# Patient Record
Sex: MALE | Race: WHITE | NOT HISPANIC OR LATINO | Employment: FULL TIME | URBAN - METROPOLITAN AREA
[De-identification: names, ages, dates, MRNs, and addresses within clinical notes are randomized per-mention and may not be internally consistent; named-entity substitution may affect disease eponyms.]

---

## 2017-05-11 ENCOUNTER — OFFICE VISIT (OUTPATIENT)
Dept: FAMILY MEDICINE | Facility: CLINIC | Age: 32
End: 2017-05-11

## 2017-05-11 VITALS
RESPIRATION RATE: 16 BRPM | HEART RATE: 76 BPM | TEMPERATURE: 98.2 F | HEIGHT: 74 IN | WEIGHT: 207 LBS | BODY MASS INDEX: 26.56 KG/M2 | SYSTOLIC BLOOD PRESSURE: 134 MMHG | DIASTOLIC BLOOD PRESSURE: 79 MMHG | OXYGEN SATURATION: 99 %

## 2017-05-11 DIAGNOSIS — L28.0 LICHEN SIMPLEX CHRONICUS: Primary | ICD-10-CM

## 2017-05-11 RX ORDER — TRIAMCINOLONE ACETONIDE 1 MG/G
CREAM TOPICAL 3 TIMES DAILY
Qty: 80 G | Refills: 0 | Status: SHIPPED | OUTPATIENT
Start: 2017-05-11 | End: 2021-07-21

## 2017-05-11 ASSESSMENT — PAIN SCALES - GENERAL: PAINLEVEL: NO PAIN (0)

## 2017-05-11 NOTE — PATIENT INSTRUCTIONS
Use the cream three times daily.    Add OTC 24 hour antihistamine such as Claritin or zyrtec.    Please let me know if this does not get better and we can consider oral steroids as we talked about.    Please feel free to call our office if you have any questions.  Thank you for allowing me to participate in your care.  It was my pleasure meeting you.  Brenda Cheung PA-C

## 2017-05-11 NOTE — PROGRESS NOTES
SUBJECTIVE:                                                    Rk Quesada is a 31 year old male new patient who will be seeing Dr. Pisano next week for a physical.  Presents to clinic today for the following health issues:    Rash: First noted one year ago on hands.    Has come and gone since then with worsening over the past 2 weeks.  Present on hands and forarms to the elbows.  In the past, episodes lasted for a few days before resolving completely with minimal treatment.  This episode is more persistent.        Description:   Location: hands and forarms bilaterally  Character: blotchy  Itching (Pruritis): YES    Progression of Symptoms:  worsening    Accompanying Signs & Symptoms:  Fever: no   Body aches or joint pain: no   Sore throat symptoms: no   Recent cold symptoms: no    History:   Previous similar rash: YES- Off and on for the psat year    Precipitating factors:   Exposure to similar rash: no   New exposures: None   Recent travel: no     Alleviating factors:  HCN cream helps the itch a bit.     Therapies Tried and outcome: In the past did not treat with anything but with this episode has used benadryl pills at night and HCN and lidocream.      No history of allergies or hayfever.  No asthma.      Patient Active Problem List    Diagnosis Date Noted     Lichen simplex chronicus 05/11/2017     Priority: Medium       History reviewed. No pertinent past medical history.    Past Surgical History:   Procedure Laterality Date     SURGICAL HISTORY OF -  Right        Family History   Problem Relation Age of Onset     Hypertension Mother      Type 1 Diabetes Brother        Social History   Substance Use Topics     Smoking status: Never Smoker     Smokeless tobacco: Not on file     Alcohol use Yes      Comment: socially       Social History     Social History Narrative    Recently moved back to [place] from [facility].  Wife is chronically ill with Lyme disease.  They are living in [place] until Rk can find a job.  "He works as a .  Great deal of stress.       Current Outpatient Prescriptions   Medication Sig Dispense Refill     OMEPRAZOLE PO Take 20 mg by mouth daily       triamcinolone (KENALOG) 0.1 % cream Apply topically 3 times daily 80 g 0         Problem list and histories reviewed & adjusted, as indicated.  Additional history: as documented    Reviewed and updated as needed this visit by clinical staff  Tobacco  Allergies  Meds  Problems  Med Hx  Surg Hx  Fam Hx  Soc Hx        Reviewed and updated as needed this visit by Provider  Tobacco  Allergies  Meds  Problems  Med Hx  Surg Hx  Fam Hx  Soc Hx          ROS:  Constitutional, HEENT, cardiovascular, pulmonary, gi and gu systems are negative, except as otherwise noted.    OBJECTIVE:                                                    /79  Pulse 76  Temp 98.2  F (36.8  C) (Oral)  Resp 16  Ht 6' 2\" (188 cm)  Wt 207 lb (93.9 kg)  SpO2 99%  BMI 26.58 kg/m2  Body mass index is 26.58 kg/(m^2).  GENERAL: healthy, alert and no distress  NECK: no adenopathy, no asymmetry, masses, or scars and thyroid normal to palpation  RESP: lungs clear to auscultation - no rales, rhonchi or wheezes  CV: regular rate and rhythm, normal S1 S2, no S3 or S4, no murmur, click or rub, no peripheral edema and peripheral pulses strong  SKIN: Blotchy maculopapular rash on the forearms bilaterally.  No vesicles or papules present.  Slightly raised areas enrique with pressure.  No palpable pain.  Mild excoriation.         ASSESSMENT/PLAN:                                                        ICD-10-CM    1. Lichen simplex chronicus L28.0 triamcinolone (KENALOG) 0.1 % cream       Patient Instructions   Use the cream three times daily.    Add OTC 24 hour antihistamine such as Claritin or zyrtec.    Please let me know if this does not get better and we can consider oral steroids as we talked about.    Please feel free to call our office if you have any questions.  " Thank you for allowing me to participate in your care.  It was my pleasure meeting you.  Brenda Cheung PA-C  [facility]

## 2017-05-11 NOTE — MR AVS SNAPSHOT
After Visit Summary   5/11/2017    Rk Quesada    MRN: [id number]           Patient Information     Date Of Birth          1985        Visit Information        Provider Department      5/11/2017 11:20 AM Smiley Cheung PA-C [facility]        Today's Diagnoses     Lichen simplex chronicus    -  1      Care Instructions    Use the cream three times daily.    Add OTC 24 hour antihistamine such as Claritin or zyrtec.    Please let me know if this does not get better and we can consider oral steroids as we talked about.    Please feel free to call our office if you have any questions.  Thank you for allowing me to participate in your care.  It was my pleasure meeting you.  Brenda Cheung PA-C          Follow-ups after your visit        Your next 10 appointments already scheduled     Jun 07, 2017  1:00 PM CDT   PHYSICAL with José Luis Pisano MD   [facility] ([facility])    [address]  [place] [address]   [phone]              Who to contact     Please call your clinic at [phone] to:    Ask questions about your health    Make or cancel appointments    Discuss your medicines    Learn about your test results    Speak to your doctor   If you have compliments or concerns about an experience at your clinic, or if you wish to file a complaint, please contact [facility] Patient Relations at [phone] or email us at [email or web address]         Additional Information About Your Visit        MyChart Information     [employer] is an electronic gateway that provides easy, online access to your medical records. With [employer], you can request a clinic appointment, read your test results, renew a prescription or communicate with your care team.     To sign up for [employer] visit the website at [email or web address]   You will be asked to enter the access code listed below, as well as  "some personal information. Please follow the directions to create your username and password.     Your access code is: [email or web address]  Expires: 2017  8:09 AM     Your access code will  in 90 days. If you need help or a new code, please contact your [facility] or call [phone] for assistance.        Care EveryWhere ID     This is your Care EveryWhere ID. This could be used by other organizations to access your [facility] medical records  [id number]        Your Vitals Were     Pulse Temperature Respirations Height Pulse Oximetry BMI (Body Mass Index)    76 98.2  F (36.8  C) (Oral) 16 6' 2\" (188 cm) 99% 26.58 kg/m2       Blood Pressure from Last 3 Encounters:   17 147/88    Weight from Last 3 Encounters:   17 207 lb (93.9 kg)              Today, you had the following     No orders found for display         Today's Medication Changes          These changes are accurate as of: 17 11:44 AM.  If you have any questions, ask your nurse or doctor.               Start taking these medicines.        Dose/Directions    triamcinolone 0.1 % cream   Commonly known as:  KENALOG   Used for:  Lichen simplex chronicus   Started by:  Smiley Cheung PA-C        Apply topically 3 times daily   Quantity:  80 g   Refills:  0            Where to get your medicines      These medications were sent to [id number] - [place], [place] - [address] AT [address] [place] [address] & [address], [place] [address]    Hours:  Test fax successful 02   Phone:  [phone]     triamcinolone 0.1 % cream                Primary Care Provider Office Phone # Fax #    José Luis Pisano [phone]       [facility] [address] [place] [address] [address]  [facility] [address]        Thank you!     Thank you for choosing [facility]  for your care. Our goal is always to provide you with excellent care. Hearing back from our " patients is one way we can continue to improve our services. Please take a few minutes to complete the written survey that you may receive in the mail after your visit with us. Thank you!             Your Updated Medication List - Protect others around you: Learn how to safely use, store and throw away your medicines at www.disposemymeds.org.          This list is accurate as of: 5/11/17 11:44 AM.  Always use your most recent med list.                   Brand Name Dispense Instructions for use    OMEPRAZOLE PO      Take 20 mg by mouth daily       triamcinolone 0.1 % cream    KENALOG    80 g    Apply topically 3 times daily

## 2017-05-11 NOTE — NURSING NOTE
"31 year old  Chief Complaint   Patient presents with     Derm Problem     rash on both arms very itchy       Blood pressure 147/88, pulse 76, temperature 98.2  F (36.8  C), temperature source Oral, resp. rate 16, height 6' 2\" (188 cm), weight 207 lb (93.9 kg), SpO2 99 %. Body mass index is 26.58 kg/(m^2).  There is no problem list on file for this patient.      Wt Readings from Last 2 Encounters:   05/11/17 207 lb (93.9 kg)     BP Readings from Last 3 Encounters:   05/11/17 147/88         Current Outpatient Prescriptions   Medication     OMEPRAZOLE PO     No current facility-administered medications for this visit.        Social History   Substance Use Topics     Smoking status: Never Smoker     Smokeless tobacco: Not on file     Alcohol use Yes      Comment: socially       Health Maintenance Due   Topic Date Due     TETANUS IMMUNIZATION (SYSTEM ASSIGNED)  08/15/2002       No results found for: PAP      May 11, 2017 11:23 AM    "

## 2017-05-11 NOTE — LETTER
[employer]  [facility]  [address], [address]  [place] [address]  Fax: [phone]  Phone: [phone]      May 10, 2017      Rk Quesada  [address] [place] [address]        Dear Rk,    Thank you for your interest in becoming a [employer] user!    Your access code is: [email or web address]  Expires: 2017  8:09 AM     Please access the [employer] website at [email or web address].  Below the ID and password fields, select the  Sign Up Now  as New User.  You will be prompted to enter the access code listed above as well as additional personal information.  Please follow the directions carefully when creating your username and password.    If you allow your access code to , or if you have any questions please call a [employer] Representative at [phone] during normal clinic hours.     Sincerely,      [employer]  [facility]

## 2017-06-07 ENCOUNTER — TELEPHONE (OUTPATIENT)
Dept: FAMILY MEDICINE | Facility: CLINIC | Age: 32
End: 2017-06-07

## 2017-06-07 ENCOUNTER — OFFICE VISIT (OUTPATIENT)
Dept: FAMILY MEDICINE | Facility: CLINIC | Age: 32
End: 2017-06-07

## 2017-06-07 VITALS
WEIGHT: 204 LBS | BODY MASS INDEX: 27.63 KG/M2 | OXYGEN SATURATION: 98 % | SYSTOLIC BLOOD PRESSURE: 138 MMHG | TEMPERATURE: 97.7 F | HEART RATE: 75 BPM | DIASTOLIC BLOOD PRESSURE: 87 MMHG | HEIGHT: 72 IN

## 2017-06-07 DIAGNOSIS — R03.0 BORDERLINE BLOOD PRESSURE: ICD-10-CM

## 2017-06-07 DIAGNOSIS — I86.1 VARICOCELE: ICD-10-CM

## 2017-06-07 DIAGNOSIS — K21.9 GASTROESOPHAGEAL REFLUX DISEASE, ESOPHAGITIS PRESENCE NOT SPECIFIED: ICD-10-CM

## 2017-06-07 DIAGNOSIS — R61 NIGHT SWEATS: Primary | ICD-10-CM

## 2017-06-07 LAB
ALBUMIN SERPL-MCNC: 4.6 G/DL (ref 3.4–5)
ALP SERPL-CCNC: 73 U/L (ref 40–150)
ALT SERPL W P-5'-P-CCNC: 59 U/L (ref 0–70)
ANION GAP SERPL CALCULATED.3IONS-SCNC: 6 MMOL/L (ref 3–14)
AST SERPL W P-5'-P-CCNC: 31 U/L (ref 0–45)
BASOPHILS # BLD AUTO: 0 10E9/L (ref 0–0.2)
BASOPHILS NFR BLD AUTO: 0.4 %
BILIRUB SERPL-MCNC: 1.4 MG/DL (ref 0.2–1.3)
BUN SERPL-MCNC: 12 MG/DL (ref 7–30)
CALCIUM SERPL-MCNC: 9.4 MG/DL (ref 8.5–10.1)
CHLORIDE SERPL-SCNC: 103 MMOL/L (ref 94–109)
CHOLEST SERPL-MCNC: 166 MG/DL (ref 0–200)
CHOLEST/HDLC SERPL: 3.6 {RATIO} (ref 0–5)
CO2 SERPL-SCNC: 28 MMOL/L (ref 20–32)
CREAT SERPL-MCNC: 1.06 MG/DL (ref 0.66–1.25)
DIFFERENTIAL METHOD BLD: NORMAL
EOSINOPHIL # BLD AUTO: 0.1 10E9/L (ref 0–0.7)
EOSINOPHIL NFR BLD AUTO: 0.9 %
ERYTHROCYTE [DISTWIDTH] IN BLOOD BY AUTOMATED COUNT: 13 % (ref 10–15)
FASTING SPECIMEN: YES
GFR SERPL CREATININE-BSD FRML MDRD: 81 ML/MIN/1.7M2
GLUCOSE SERPL-MCNC: 89 MG/DL (ref 70–99)
HCT VFR BLD AUTO: 48.2 % (ref 40–53)
HDLC SERPL-MCNC: 46 MG/DL
HGB BLD-MCNC: 16 G/DL (ref 13.3–17.7)
IMM GRANULOCYTES # BLD: 0 10E9/L (ref 0–0.4)
IMM GRANULOCYTES NFR BLD: 0.2 %
LDLC SERPL CALC-MCNC: 102 MG/DL (ref 0–129)
LYMPHOCYTES # BLD AUTO: 1.8 10E9/L (ref 0.8–5.3)
LYMPHOCYTES NFR BLD AUTO: 34.4 %
MCH RBC QN AUTO: 29.1 PG (ref 26.5–33)
MCHC RBC AUTO-ENTMCNC: 33.2 G/DL (ref 31.5–36.5)
MCV RBC AUTO: 88 FL (ref 78–100)
MONOCYTES # BLD AUTO: 0.5 10E9/L (ref 0–1.3)
MONOCYTES NFR BLD AUTO: 8.8 %
NEUTROPHILS # BLD AUTO: 2.9 10E9/L (ref 1.6–8.3)
NEUTROPHILS NFR BLD AUTO: 55.3 %
NRBC # BLD AUTO: 0 10*3/UL
NRBC BLD AUTO-RTO: 0 /100
PLATELET # BLD AUTO: 191 10E9/L (ref 150–450)
POTASSIUM SERPL-SCNC: 4.6 MMOL/L (ref 3.4–5.3)
PROT SERPL-MCNC: 8.1 G/DL (ref 6.8–8.8)
RBC # BLD AUTO: 5.5 10E12/L (ref 4.4–5.9)
SODIUM SERPL-SCNC: 137 MMOL/L (ref 133–144)
TRIGL SERPL-MCNC: 86 MG/DL (ref 0–150)
VLDL-CHOLESTEROL: 17 (ref 7–32)
WBC # BLD AUTO: 5.3 10E9/L (ref 4–11)

## 2017-06-07 ASSESSMENT — PAIN SCALES - GENERAL: PAINLEVEL: NO PAIN (0)

## 2017-06-07 NOTE — PATIENT INSTRUCTIONS
"ASSESSMENT/PLAN:  Current visit issues:  Check CBC, Comp and Lipids  -Discussed issues of weight, diet and exercise. Suggested exercise at least 5 times/week. Also, try the \"DASH\" diet for the borderline hypertension. Initial goal weight of around 195 lbs within the next 2-3 months.   Given the large varicocele and no pregnancies, despite no use of contraception, will refer to Urology for further evaluation.   Discussed dietary issues re: GERD. Refilled Omeprazole but asked him to use sparingly. Discussed H2 blkers, but those have not been helpful. Tums has not been helpful.   If sx persist, refer again to GI.    Preventive health care needs:    -UTD on immunizations  -Encouraged to increase exercise intensity and frequency.   "

## 2017-06-07 NOTE — NURSING NOTE
"31 year old  Chief Complaint   Patient presents with     [facility] Care     Physical       Blood pressure 138/87, pulse 75, temperature 97.7  F (36.5  C), height 6' 0.3\" (183.6 cm), weight 204 lb (92.5 kg), SpO2 98 %. Body mass index is 27.44 kg/(m^2).  Patient Active Problem List   Diagnosis     Lichen simplex chronicus       Wt Readings from Last 2 Encounters:   06/07/17 204 lb (92.5 kg)   05/11/17 207 lb (93.9 kg)     BP Readings from Last 3 Encounters:   06/07/17 138/87   05/11/17 134/79         Current Outpatient Prescriptions   Medication     OMEPRAZOLE PO     triamcinolone (KENALOG) 0.1 % cream     No current facility-administered medications for this visit.        Social History   Substance Use Topics     Smoking status: Never Smoker     Smokeless tobacco: Not on file     Alcohol use Yes      Comment: socially       Health Maintenance Due   Topic Date Due     TETANUS IMMUNIZATION (SYSTEM ASSIGNED)  08/15/2002       No results found for: DAVE Ann CMA  June 7, 2017 1:00 PM  "

## 2017-06-07 NOTE — PROGRESS NOTES
"Rk Quesada presents to [facility] today to establish care and have a general check up.      His main concerns are as follows:   -\"Low on Omeprazole\" was a reason for the visit, but he has decided that he wants to stop the medication. However, concerned that his sx may recur  Was taking medication for the past 6 months for \"heartburn\". Was dx'd with \"heartburn\" at age 18 yo.  Can't recall any evaluation for H. Pylori. Main sx were an oral sensation and then some burning in chest. He has tried to link to diet but can't find triggers. Had upper GI.  With Omeprazole, does not have sx.     Has had some rashes on arms in the heat. A few weeks ago dx'd with Lichen simplex chronicus , given Triamcinolone and sx resolved.     Regarding preventive health care,   Immunization History   Administered Date(s) Administered     DTAP (<7y) 1985, 1985, 02/14/1985     Influenza (IIV3) 08/01/2016     Poliovirus, inactivated (IPV) 1984, 1984     TDAP Vaccine (Boostrix) 08/01/2016       His last dental check up was About Dec 2016.     Social  .   Works as a , but currently unemployed. Transitioning from [place] where he and his wife lived the past 8 years.   Wife is a nurse. She suffers from Chronic Lyme Disease.     They are currently living in [place]. Looking to move to the .     Patient Active Problem List   Diagnosis     Lichen simplex chronicus     Also, having some situational issues due to dealing with his wife's illness (chronic Lyme's), losing job in [place] and then move back to [place]. Has a visit with a counselor next week.     MALE ROS  Partner: wife  ED: No concerns  Contraception: None. OK with pregnancy  STD concerns: No concerns.   BPH: No symptoms      Regarding lifestyle behaviors, his physical activity tends to consist of: 3-4 days/week. Walking, running, elliptical     His diet tends to consist of: \"Healthy diet\".  Coffee only in morning. No soda pop. No " "food after dinner. Occasional beer at night.     Alcohol intake = 2-3 days/week. Has 1-2 beers.   He does not use tobacco products.      Wears seat belt.--Yes.  Wears bike helmet--N/A..        Health Maintenance   Topic Date Due     TETANUS IMMUNIZATION (SYSTEM ASSIGNED)  08/15/2002     INFLUENZA VACCINE (SYSTEM ASSIGNED)  09/01/2017         Patient Active Problem List   Diagnosis     Lichen simplex chronicus         Family History   Problem Relation Age of Onset     Hypertension Mother      Type 1 Diabetes Brother        Current Outpatient Prescriptions   Medication Sig Dispense Refill     OMEPRAZOLE PO Take 20 mg by mouth daily       triamcinolone (KENALOG) 0.1 % cream Apply topically 3 times daily (Patient not taking: Reported on 6/7/2017) 80 g 0     No Known Allergies       ROS  CONSTITUTIONAL:NEGATIVE for  change in weight, although he would like to be about 10 lbs lighter. ADMITS to some \"night sweats.\"   INTEGUMENTARY/SKIN: NEGATIVE for worrisome rashes, moles or lesions  EYES: NEGATIVE for vision changes or irritation  ENT/MOUTH: NEGATIVE for ear, mouth and throat problems  RESP:NEGATIVE for significant cough or SOB  CV: NEGATIVE for chest pain, palpitations, ORTIZ, orthopnea, PND  or peripheral edema  GI: NEGATIVE for nausea, abdominal pain, heartburn, or change in bowel habits  :NEGATIVE for frequency, dysuria, or hematuria  MUSCULOSKELETAL:NEGATIVE for significant arthralgias or myalgia  NEURO: NEGATIVE for weakness, dizziness or paresthesias  ENDOCRINE: NEGATIVE for polyuria/dipsia,  temperature intolerance, skin/hair changes  HEME/ALLERGY/IMMUNE: NEGATIVE for bleeding problems  PSYCHIATRIC: NEGATIVE for changes in mood or affect    EXAM  /87 (BP Location: Right arm, Patient Position: Chair, Cuff Size: Adult Large)  Pulse 75  Temp 97.7  F (36.5  C)  Ht 6' 0.3\" (183.6 cm)  Wt 204 lb (92.5 kg)  SpO2 98%  BMI 27.44 kg/m2      GENERAL APPEARANCE: Alert, pleasant, NAD  EYES: PERRL, EOMI, " "conjunctiva clear  HENT: TM normal bilaterally. Nose and mouth without lesions  NECK: no adenopathy, thyroid normal to palpation  RESP: lungs clear to auscultation bilaterally  Axillae: no palpable axillary masses or adenopathy  CV: regular rate and rhythm, normal S1 S2, No murmur, no carotid bruits  ABDOMEN: soft, nontender, without HSM or masses. Bowel sounds normal  : Large varicocele on LEFT.  no inguinal hernias or adenopathy, no testicular masses  Rectal exam: deferred  MS: extremities normal- no gross deformities noted, no tender, hot or swollen joints.    SKIN: no suspicious lesions or rashes  NEURO: Normal strength and tone, sensory exam grossly normal, DTR normoreflexive in upper and lower extremities  PSYCH: mentation appears normal. and affect normal/bright.  EXT: no peripheral edema, pedal pulses palpable    IMPRESSION  30 yo male, generally healthy who has the following issues:  -Borderline BP  -\"Overweight\" by BMI  -Night sweats (he's not sure if he's just a warm sleeper as he feels well during the day)  -Also, Left side, large Varicocele.   -Long h/o GERD    ASSESSMENT/PLAN:  Current visit issues:  Check CBC, Comp and Lipids  -Discussed issues of weight, diet and exercise. Suggested exercise at least 5 times/week. Also, try the \"DASH\" diet for the borderline hypertension. Initial goal weight of around 195 lbs within the next 2-3 months.   Given the large varicocele and no pregnancies, despite no use of contraception, will refer to Urology for further evaluation.   Discussed dietary issues re: GERD. Refilled Omeprazole but asked him to use sparingly. Discussed H2 blkers, but those have not been helpful. Tums has not been helpful.   If sx persist, refer again to GI.    Preventive health care needs:    -UTD on immunizations  -Encouraged to increase exercise intensity and frequency.       --José Luis Pisano MD      "

## 2017-06-07 NOTE — LETTER
"                                      [facility]  [address]  [facility] [place] [address]  Phone: [phone]  Fax: [phone]       Date: June 8, 2017      Rk Dipak  [address] [place] [address]        Hi Rk,  Good news. All of your labs returned in the 'normal' range. No signs of kidney or liver problems. The Bilirubin was 0.1mg/dl above \"normal.\" However, we see this in many people.   --José Luis Pisano MD    Resulted Orders   CBC with platelets differential   Result Value Ref Range    WBC 5.3 4.0 - 11.0 10e9/L    RBC Count 5.50 4.4 - 5.9 10e12/L    Hemoglobin 16.0 13.3 - 17.7 g/dL    Hematocrit 48.2 40.0 - 53.0 %    MCV 88 78 - 100 fl    MCH 29.1 26.5 - 33.0 pg    MCHC 33.2 31.5 - 36.5 g/dL    RDW 13.0 10.0 - 15.0 %    Platelet Count 191 150 - 450 10e9/L    Diff Method Automated Method     % Neutrophils 55.3 %    % Lymphocytes 34.4 %    % Monocytes 8.8 %    % Eosinophils 0.9 %    % Basophils 0.4 %    % Immature Granulocytes 0.2 %    Nucleated RBCs 0 0 /100    Absolute Neutrophil 2.9 1.6 - 8.3 10e9/L    Absolute Lymphocytes 1.8 0.8 - 5.3 10e9/L    Absolute Monocytes 0.5 0.0 - 1.3 10e9/L    Absolute Eosinophils 0.1 0.0 - 0.7 10e9/L    Absolute Basophils 0.0 0.0 - 0.2 10e9/L    Abs Immature Granulocytes 0.0 0 - 0.4 10e9/L    Absolute Nucleated RBC 0.0    Lipid Panel ([place])   Result Value Ref Range    FASTING SPECIMEN yes     Cholesterol 166.0 0.0 - 200.0    HDL Cholesterol 46.0 >40.0    Triglycerides 86.0 0.0 - 150.0    Cholesterol/HDL Ratio 3.6 0.0 - 5.0    LDL Cholesterol Direct 102.0 0.0 - 129.0    VLDL-Cholesterol 17.0 7.0 - 32.0   Comprehensive metabolic panel   Result Value Ref Range    Sodium 137 133 - 144 mmol/L    Potassium 4.6 3.4 - 5.3 mmol/L    Chloride 103 94 - 109 mmol/L    Carbon Dioxide 28 20 - 32 mmol/L    Anion Gap 6 3 - 14 mmol/L    Glucose 89 70 - 99 mg/dL    Urea Nitrogen 12 7 - 30 mg/dL    Creatinine 1.06 0.66 - 1.25 mg/dL "    GFR Estimate 81 >60 mL/min/1.7m2      Comment:      Non  GFR Calc    GFR Estimate If Black >90   GFR Calc   >60 mL/min/1.7m2    Calcium 9.4 8.5 - 10.1 mg/dL    Bilirubin Total 1.4 (H) 0.2 - 1.3 mg/dL    Albumin 4.6 3.4 - 5.0 g/dL    Protein Total 8.1 6.8 - 8.8 g/dL    Alkaline Phosphatase 73 40 - 150 U/L    ALT 59 0 - 70 U/L    AST 31 0 - 45 U/L

## 2017-06-07 NOTE — MR AVS SNAPSHOT
"              After Visit Summary   6/7/2017    Rk Quesada    MRN: [id number]           Patient Information     Date Of Birth          1985        Visit Information        Provider Department      6/7/2017 1:00 PM José Luis Pisano MD [facility]        Today's Diagnoses     Night sweats    -  1    Borderline blood pressure        Gastroesophageal reflux disease, esophagitis presence not specified        Varicocele          Care Instructions    ASSESSMENT/PLAN:  Current visit issues:  Check CBC, Comp and Lipids  -Discussed issues of weight, diet and exercise. Suggested exercise at least 5 times/week. Also, try the \"DASH\" diet for the borderline hypertension. Initial goal weight of around 195 lbs within the next 2-3 months.   Given the large varicocele and no pregnancies, despite no use of contraception, will refer to Urology for further evaluation.   Discussed dietary issues re: GERD. Refilled Omeprazole but asked him to use sparingly. Discussed H2 blkers, but those have not been helpful. Tums has not been helpful.   If sx persist, refer again to GI.    Preventive health care needs:    -UTD on immunizations  -Encouraged to increase exercise intensity and frequency.           Follow-ups after your visit        Additional Services     UROLOGY ADULT REFERRAL       Your provider has referred you to: [facility]: [facility] [place], [place] [phone]   [email or web address]  Indication: Varicocele with question of infertility    Please be aware that coverage of these services is subject to the terms and limitations of your health insurance plan.  Call member services at your health plan with any benefit or coverage questions.      Please bring the following with you to your appointment:    (1) Any X-Rays, CTs or MRIs which have been performed.  Contact the facility where they were done to arrange for  prior to your scheduled appointment.  " "  (2) List of current medications  (3) This referral request   (4) Any documents/labs given to you for this referral                  Who to contact     Please call your clinic at [phone] to:    Ask questions about your health    Make or cancel appointments    Discuss your medicines    Learn about your test results    Speak to your doctor   If you have compliments or concerns about an experience at your clinic, or if you wish to file a complaint, please contact [facility] Patient Relations at [phone] or email us at [email or web address]         Additional Information About Your Visit        Panaya Information     Panaya is an electronic gateway that provides easy, online access to your medical records. With Panaya, you can request a clinic appointment, read your test results, renew a prescription or communicate with your care team.     To sign up for Panaya visit the website at [email or web address]   You will be asked to enter the access code listed below, as well as some personal information. Please follow the directions to create your username and password.     Your access code is: [email or web address]  Expires: 2017  8:09 AM     Your access code will  in 90 days. If you need help or a new code, please contact your [facility] or call [phone] for assistance.        Care EveryWhere ID     This is your Care EveryWhere ID. This could be used by other organizations to access your [facility] medical records  [id number]        Your Vitals Were     Pulse Temperature Height Pulse Oximetry BMI (Body Mass Index)       75 97.7  F (36.5  C) 6' 0.3\" (183.6 cm) 98% 27.44 kg/m2        Blood Pressure from Last 3 Encounters:   17 138/87   17 134/79    Weight from Last 3 Encounters:   17 204 lb (92.5 kg)   17 207 lb (93.9 kg)              We Performed the Following     CBC with platelets differential     " Comprehensive metabolic panel     Lipid Panel ([place])     UROLOGY ADULT REFERRAL          Today's Medication Changes          These changes are accurate as of: 6/7/17  1:45 PM.  If you have any questions, ask your nurse or doctor.               These medicines have changed or have updated prescriptions.        Dose/Directions    omeprazole 20 MG CR capsule   Commonly known as:  priLOSEC   This may have changed:  medication strength   Used for:  Gastroesophageal reflux disease, esophagitis presence not specified   Changed by:  José Luis Pisano MD        Dose:  20 mg   Take 1 capsule (20 mg) by mouth daily   Quantity:  30 capsule   Refills:  3            Where to get your medicines      These medications were sent to [id number] - [place], [place] - [address] AT [address] [place] [address] & [address], [place] [address]    Hours:  Test fax successful 9/6/02   Phone:  [phone]     omeprazole 20 MG CR capsule                Primary Care Provider Office Phone # Fax #    José Luis Pisano [phone]       [facility] [address] [place] [address] [address]  [facility] [address]        Thank you!     Thank you for choosing [facility]  for your care. Our goal is always to provide you with excellent care. Hearing back from our patients is one way we can continue to improve our services. Please take a few minutes to complete the written survey that you may receive in the mail after your visit with us. Thank you!             Your Updated Medication List - Protect others around you: Learn how to safely use, store and throw away your medicines at www.disposemymeds.org.          This list is accurate as of: 6/7/17  1:45 PM.  Always use your most recent med list.                   Brand Name Dispense Instructions for use    omeprazole 20 MG CR capsule    priLOSEC    30 capsule    Take 1 capsule (20 mg) by mouth daily       triamcinolone 0.1 %  cream    KENALOG    80 g    Apply topically 3 times daily

## 2018-02-08 ENCOUNTER — OFFICE VISIT (OUTPATIENT)
Dept: DERMATOLOGY | Facility: CLINIC | Age: 33
End: 2018-02-08
Payer: COMMERCIAL

## 2018-02-08 DIAGNOSIS — D48.5 NEOPLASM OF UNCERTAIN BEHAVIOR OF SKIN: Primary | ICD-10-CM

## 2018-02-08 ASSESSMENT — PAIN SCALES - GENERAL: PAINLEVEL: NO PAIN (0)

## 2018-02-08 NOTE — LETTER
"2/8/2018       RE: Rk Quesada  [address]  [place] [address]     Dear Colleague,    Thank you for referring your patient, Rk Quesada, to the [facility] DERMATOLOGY at [facility]. Please see a copy of my visit note below.    [facility] Dermatology Note      Dermatology Problem List:   1. Lichen simplex chronicus, resolved with triamcinolone 0.1% cream     Encounter Date: Feb 8, 2018    CC:  Chief Complaint   Patient presents with     Derm Problem     Mr. Quesada is here today to have a lesion on the right temple. It has been there for maybe ten years or less. It has recently started flaking. It was biopsied about 2 years ago and it was noncancerous.          History of Present Illness:  Mr. Rk Quesada is a 32 year old male who presents today with concerns about a lesion on the right temple. The patient reports he developed a \"spot\" on the right temple about 10 years ago. This seemed to be getting bigger and therefore had it biopsied at [facility] approximately 2 years ago, which returned benign (records not currently available). However, patient states he was in [place] two weeks ago and noticed the same area over the right temple started to become dry and flaking. It was previously not bothersome to the patient but has continued to do this and therefore would like it evaluated.     He otherwise denies any other areas of concern.      Past Medical History:   Patient Active Problem List   Diagnosis     Lichen simplex chronicus     Past Medical History:   Diagnosis Date     Hand injury 2013    Tore Hand ligarment     Past Surgical History:   Procedure Laterality Date     HC TOOTH EXTRACTION W/FORCEP  2001     SURGICAL HISTORY OF -  Right        Social History:  The patient works. The patient admits to use of tanning beds (2-3 times prior to vacations). He previously lived in [place] for 8 years and therefore has had significant year round " sun exposure in the past. Is originally from [place]. 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     Family History:  There is no family history of skin cancer.    Medications:  Current Outpatient Prescriptions   Medication Sig Dispense Refill     omeprazole (PRILOSEC) 20 MG CR capsule Take 1 capsule (20 mg) by mouth daily (Patient not taking: Reported on 2/8/2018) 30 capsule 3     triamcinolone (KENALOG) 0.1 % cream Apply topically 3 times daily (Patient not taking: Reported on 6/7/2017) 80 g 0       No Known Allergies    Review of Systems:  -Skin/Heme New Pt: The patient admits to frequent sun exposure. The patient denies excessive scarring or problems healing except as per HPI. The patient denies excessive bleeding.  -Constitutional: The patient denies fatigue, fevers, chills, unintended weight loss, and night sweats.  -HEENT: Patient denies nonhealing oral sores.  -Skin: As above in HPI. No additional skin concerns.    Physical exam:  Vitals: There were no vitals taken for this visit.  GEN: This is a well developed, well-nourished male in no acute distress, in a pleasant mood.    SKIN: Focused examination of the face was performed.  - Thin brown plaque to right temple with mild scale and erythema.   -No other lesions of concern on areas examined.       Impression/Plan:  1. Skin lesion, likely a seborrheic keratosis.    Discussed treatment options including shave biopsy vs. Monitoring for changes. However, the patient states he would like to know what the old pathology report from [facility] says before making a decision. Therefore, we will request these records. I will also refer the patient to plastic surgery to potentially have the lesion removed with margins and cosmetic repair.     Photo documentation performed today for further reference.     I received the pathology report and medical record from [facility]. The pathology returned as a 2 mm punch bx consistent with with a solar  lentigo with dermal melanosis. Since this is a melanocyctic lesion and changing, Discussed with Dr Layne. Recommended consultation with him and discuss shave bx vs excisional bx. A dermatology surgery referral was placed.     Follow-up as needed.     Staff Involved:  Scribe/Staff    Scribe Disclosure:   I, Tammy Soriano, am serving as a scribe to document services personally performed by Tiana Smith PA-C, based on data collection and the provider's statements to me.    Provider Disclosure:   The documentation recorded by the scribe accurately reflects the services I personally performed and the decisions made by me.    All risks, benefits and alternatives were discussed with patient.  Patient is in agreement and understands the assessment and plan.  All questions were answered.  Sun Screen Education was given.   Return to Clinic as needed after repeat bx.   Tiana Smith PA-C   [facility]

## 2018-02-08 NOTE — PROGRESS NOTES
"[facility] Dermatology Note      Dermatology Problem List:   1. Lichen simplex chronicus, resolved with triamcinolone 0.1% cream     Encounter Date: Feb 8, 2018    CC:  Chief Complaint   Patient presents with     Derm Problem     Mr. Quesada is here today to have a lesion on the right temple. It has been there for maybe ten years or less. It has recently started flaking. It was biopsied about 2 years ago and it was noncancerous.          History of Present Illness:  Mr. Rk Quesada is a 32 year old male who presents today with concerns about a lesion on the right temple. The patient reports he developed a \"spot\" on the right temple about 10 years ago. This seemed to be getting bigger and therefore had it biopsied at [facility] approximately 2 years ago, which returned benign (records not currently available). However, patient states he was in [place] two weeks ago and noticed the same area over the right temple started to become dry and flaking. It was previously not bothersome to the patient but has continued to do this and therefore would like it evaluated.     He otherwise denies any other areas of concern.      Past Medical History:   Patient Active Problem List   Diagnosis     Lichen simplex chronicus     Past Medical History:   Diagnosis Date     Hand injury 2013    Tore Hand ligarment     Past Surgical History:   Procedure Laterality Date     HC TOOTH EXTRACTION W/FORCEP  2001     SURGICAL HISTORY OF -  Right        Social History:  The patient works. The patient admits to use of tanning beds (2-3 times prior to vacations). He previously lived in [place] for 8 years and therefore has had significant year round sun exposure in the past. Is originally from [place]. 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     Family History:  There is no family history of skin cancer.    Medications:  Current Outpatient Prescriptions   Medication Sig Dispense Refill     " omeprazole (PRILOSEC) 20 MG CR capsule Take 1 capsule (20 mg) by mouth daily (Patient not taking: Reported on 2/8/2018) 30 capsule 3     triamcinolone (KENALOG) 0.1 % cream Apply topically 3 times daily (Patient not taking: Reported on 6/7/2017) 80 g 0       No Known Allergies    Review of Systems:  -Skin/Heme New Pt: The patient admits to frequent sun exposure. The patient denies excessive scarring or problems healing except as per HPI. The patient denies excessive bleeding.  -Constitutional: The patient denies fatigue, fevers, chills, unintended weight loss, and night sweats.  -HEENT: Patient denies nonhealing oral sores.  -Skin: As above in HPI. No additional skin concerns.    Physical exam:  Vitals: There were no vitals taken for this visit.  GEN: This is a well developed, well-nourished male in no acute distress, in a pleasant mood.    SKIN: Focused examination of the face was performed.  - Thin brown plaque to right temple with mild scale and erythema.   -No other lesions of concern on areas examined.       Impression/Plan:  1. Skin lesion, likely a seborrheic keratosis.    Discussed treatment options including shave biopsy vs. Monitoring for changes. However, the patient states he would like to know what the old pathology report from [facility] says before making a decision. Therefore, we will request these records. I will also refer the patient to plastic surgery to potentially have the lesion removed with margins and cosmetic repair.     Photo documentation performed today for further reference.     I received the pathology report and medical record from [facility]. The pathology returned as a 2 mm punch bx consistent with with a solar lentigo with dermal melanosis. Since this is a melanocyctic lesion and changing, Discussed with Dr Layne. Recommended consultation with him and discuss shave bx vs excisional bx. A dermatology surgery referral was placed.     Follow-up as needed.     Staff  Involved:  Scribe/Staff    Scribe Disclosure:   I, Tammycarlyle Soriano, am serving as a scribe to document services personally performed by Tiana Smith PA-C, based on data collection and the provider's statements to me.    Provider Disclosure:   The documentation recorded by the scribe accurately reflects the services I personally performed and the decisions made by me.    All risks, benefits and alternatives were discussed with patient.  Patient is in agreement and understands the assessment and plan.  All questions were answered.  Sun Screen Education was given.   Return to Clinic as needed after repeat bx.   Tiana Smith PA-C   [facility]

## 2018-02-08 NOTE — MR AVS SNAPSHOT
After Visit Summary   2/8/2018    Rk Quesada    MRN: [id number]           Patient Information     Date Of Birth          1985        Visit Information        Provider Department      2/8/2018 2:30 PM Tiana Smith PA-C [facility] Dermatology        Today's Diagnoses     Neoplasm of uncertain behavior of skin    -  1       Follow-ups after your visit        Additional Services     DERMATOLOGY SURGERY REFERRAL       Result Note/Instructions:  Received pathology report 2 mm punch bx lentigo with dermal melanosis.                  Who to contact     Please call your clinic at [phone] to:    Ask questions about your health    Make or cancel appointments    Discuss your medicines    Learn about your test results    Speak to your doctor            Additional Information About Your Visit        [employer] Customer Support Information     Personal gives you secure access to your electronic health record. If you see a primary care provider, you can also send messages to your care team and make appointments. If you have questions, please call your primary care clinic.  If you do not have a primary care provider, please call [phone] and they will assist you.      Personal is an electronic gateway that provides easy, online access to your medical records. With Personal, you can request a clinic appointment, read your test results, renew a prescription or communicate with your care team.     To access your existing account, please contact your [facility] or call [phone] for assistance.        Care EveryWhere ID     This is your Care EveryWhere ID. This could be used by other organizations to access your [facility] medical records  [id number]         Blood Pressure from Last 3 Encounters:   06/07/17 138/87   05/11/17 134/79    Weight from Last 3 Encounters:   06/07/17 92.5 kg (204 lb)   05/11/17 93.9 kg (207 lb)              We Performed the Following     DERMATOLOGY SURGERY  REFERRAL        Primary Care Provider Office Phone # Fax #    José Luis Pisano [phone]       [address]  [place] [address]        Equal Access to Services     NABIL MILLER : Rayna west maria ro Sojillianali, waaxda luqadaha, qaybta kaalmada adeegyada, claudia krusen tomásrandee montelongoanshul wilburn. So [facility] [phone].    ATENCIÓN: Si habla español, tiene a flores disposición servicios gratuitos de asistencia lingüística. Llame al [phone].    We comply with applicable federal civil rights laws and [place] laws. We do not discriminate on the basis of race, color, national origin, age, disability, sex, sexual orientation, or gender identity.            Thank you!     Thank you for choosing [facility] DERMATOLOGY  for your care. Our goal is always to provide you with excellent care. Hearing back from our patients is one way we can continue to improve our services. Please take a few minutes to complete the written survey that you may receive in the mail after your visit with us. Thank you!             Your Updated Medication List - Protect others around you: Learn how to safely use, store and throw away your medicines at www.disposemymeds.org.          This list is accurate as of 2/8/18 11:59 PM.  Always use your most recent med list.                   Brand Name Dispense Instructions for use Diagnosis    omeprazole 20 MG CR capsule    priLOSEC    30 capsule    Take 1 capsule (20 mg) by mouth daily    Gastroesophageal reflux disease, esophagitis presence not specified       triamcinolone 0.1 % cream    KENALOG    80 g    Apply topically 3 times daily    Lichen simplex chronicus

## 2018-02-08 NOTE — NURSING NOTE
Chief Complaint   Patient presents with     Derm Problem     Mr. Quesada is here today to have a lesion on the right temple. It has been there for maybe ten years or less. It has recently started flaking. It was biopsied about 2 years ago and it was noncancerous.      Margarita Bradley CMA

## 2018-02-14 ENCOUNTER — TELEPHONE (OUTPATIENT)
Dept: DERMATOLOGY | Facility: CLINIC | Age: 33
End: 2018-02-14

## 2018-02-14 NOTE — TELEPHONE ENCOUNTER
Records received from Dr. Grady office  ( from [place] ), placed in scanning. I left a  for Rk in attempt to schedule appt.  See message below from provider.      I sent a derm referral for Rk.     His pathology report showed lentigo (sun spot) with melanin deeper in the skin. Talked with derm surgeon, Dr Layne, recommend a consult for excisional bx (remove as an excision) vs shave bx.     Call and let him know! Thanks!     Thanks, Tiana

## 2018-02-27 ENCOUNTER — OFFICE VISIT (OUTPATIENT)
Dept: DERMATOLOGY | Facility: CLINIC | Age: 33
End: 2018-02-27
Payer: COMMERCIAL

## 2018-02-27 DIAGNOSIS — L81.4 SOLAR LENTIGO: Primary | ICD-10-CM

## 2018-02-27 ASSESSMENT — PAIN SCALES - GENERAL: PAINLEVEL: NO PAIN (0)

## 2018-02-27 NOTE — PROGRESS NOTES
[facility] Dermatology Note      Dermatology Problem List:  1. Lichen simplex chronicus, resolved with triamcinolone 0.1% cream   2. Solar lentigo w/ dermal melanosis, R temple     Encounter Date: Feb 27, 2018    CC:  Chief Complaint   Patient presents with     Derm Problem     Rk is here for a consult to remove a spot on his face.       History of Present Illness:  Mr. Rk Quesada is a 32 year old male who presents today for a consultation regarding a lesion located on his right temple. At his last appointment with Dr. Smith on 2/8/18, they discussed possible treatment options, however the patient was interested in recovering a pathology report from a biopsy that was taken on the lesion 2 years ago. Dr. Smith was able to recover the record, which returned as a solar lentigo with dermal melanosis.     The patient says the lesion appeared about 10 years ago. Over the past two years it seems like the lesion is progressively getting bigger. He went to a conference in [place] for about a week recently, and when he returned it had become dry, pruritic, and flaky. He thought the sun had aggravated it. He lived in [place] for 8 years earlier in his life.     The patient is otherwise feeling well. There are no other skin concerns at this time.    Past Medical History:   Patient Active Problem List   Diagnosis     Lichen simplex chronicus     Past Medical History:   Diagnosis Date     Hand injury 2013    Tore Hand ligarment     Past Surgical History:   Procedure Laterality Date     HC TOOTH EXTRACTION W/FORCEP  2001     SURGICAL HISTORY OF -  Right        Social History:  The patient works. The patient admits to use of tanning beds (2-3 times prior to vacations). He previously lived in [place] for 8 years and therefore has had significant year round sun exposure in the past. Is originally from [place]. 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      Family History:  There is no family history of skin cancer.    Medications:  Current Outpatient Prescriptions   Medication Sig Dispense Refill     omeprazole (PRILOSEC) 20 MG CR capsule Take 1 capsule (20 mg) by mouth daily (Patient not taking: Reported on 2/8/2018) 30 capsule 3     triamcinolone (KENALOG) 0.1 % cream Apply topically 3 times daily (Patient not taking: Reported on 6/7/2017) 80 g 0       No Known Allergies    Review of Systems:  -Skin Establ Pt: The patient denies any new rash, pruritus, or lesions that are symptomatic, changing or bleeding, except as per HPI.  -Constitutional: The patient is feeling generally well.    Physical exam:  Vitals: There were no vitals taken for this visit.  GEN: This is a well developed, well-nourished male in no acute distress, in a pleasant mood.    SKIN: Focused examination of the lesion of concern was performed.  - R temple, 1.2 x 0.8 cm tan ovoid patch   - No other lesions of concern on areas examined.     Impression/Plan:  1. Solar lentigo with dermal melanosis, R temple    Dermoscopy with uniform and reassuring features.     Recent irritation and scaling could have a component of lichen planus like keratosis.     Discussed options for treatment. Told patient that if we were to excise the spot he would be trading the lesion for a scar, but would get pathologic confirmation of the diagnosis again.     Also discussed he possibility of treatment with lasers to reduce pigmentation. Discussed that laser treatment may require several treatment sessions.     Patient expressed interest in laser treatment and will be following up and scheduling an appointment with Dr. Longo.     Follow-up with Dr. Longo for laser consultation.     Staff Involved:    Scribe Disclosure  I, Michael Hernandez, josé luis serving as a scribe to document services personally performed by Dr. Antonio Layne DO, based on data collection and the provider's statements to me.     Provider Disclosure:   The  documentation recorded by the scribe accurately reflects the services I personally performed and the decisions made by me.    Antonio Layne DO    Department of Dermatology  [facility]: Phone: [phone], Fax:[phone]  [facility] [place]: Phone: [phone], Fax: [phone]            Picture placed in chart for future reference.

## 2018-02-27 NOTE — NURSING NOTE
Chief Complaint   Patient presents with     Derm Problem     Rk is here for a consult to remove a spot on his face.     Gracie Cordero CMA

## 2018-02-27 NOTE — LETTER
2/27/2018       RE: Rk Quesada  [address]  [place] [address]     Dear Colleague,    Thank you for referring your patient, Rk Quesada, to the [facility] DERMATOLOGIC SURGERY at [facility]. Please see a copy of my visit note below.    [facility] Dermatology Note      Dermatology Problem List:  1. Lichen simplex chronicus, resolved with triamcinolone 0.1% cream   2. Solar lentigo w/ dermal melanosis, R temple     Encounter Date: Feb 27, 2018    CC:  Chief Complaint   Patient presents with     Derm Problem     Rk is here for a consult to remove a spot on his face.       History of Present Illness:  Mr. Rk Quesada is a 32 year old male who presents today for a consultation regarding a lesion located on his right temple. At his last appointment with Dr. Smith on 2/8/18, they discussed possible treatment options, however the patient was interested in recovering a pathology report from a biopsy that was taken on the lesion 2 years ago. Dr. Smith was able to recover the record, which returned as a solar lentigo with dermal melanosis.     The patient says the lesion appeared about 10 years ago. Over the past two years it seems like the lesion is progressively getting bigger. He went to a conference in [place] for about a week recently, and when he returned it had become dry, pruritic, and flaky. He thought the sun had aggravated it. He lived in [place] for 8 years earlier in his life.     The patient is otherwise feeling well. There are no other skin concerns at this time.    Past Medical History:   Patient Active Problem List   Diagnosis     Lichen simplex chronicus     Past Medical History:   Diagnosis Date     Hand injury 2013    Tore Hand ligarment     Past Surgical History:   Procedure Laterality Date     HC TOOTH EXTRACTION W/FORCEP  2001     SURGICAL HISTORY OF -  Right        Social History:  The patient works. The  patient admits to use of tanning beds (2-3 times prior to vacations). He previously lived in [place] for 8 years and therefore has had significant year round sun exposure in the past. Is originally from [place]. 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     Family History:  There is no family history of skin cancer.    Medications:  Current Outpatient Prescriptions   Medication Sig Dispense Refill     omeprazole (PRILOSEC) 20 MG CR capsule Take 1 capsule (20 mg) by mouth daily (Patient not taking: Reported on 2/8/2018) 30 capsule 3     triamcinolone (KENALOG) 0.1 % cream Apply topically 3 times daily (Patient not taking: Reported on 6/7/2017) 80 g 0       No Known Allergies    Review of Systems:  -Skin Establ Pt: The patient denies any new rash, pruritus, or lesions that are symptomatic, changing or bleeding, except as per HPI.  -Constitutional: The patient is feeling generally well.    Physical exam:  Vitals: There were no vitals taken for this visit.  GEN: This is a well developed, well-nourished male in no acute distress, in a pleasant mood.    SKIN: Focused examination of the lesion of concern was performed.  - R temple, 1.2 x 0.8 cm tan ovoid patch   - No other lesions of concern on areas examined.     Impression/Plan:  1. Solar lentigo with dermal melanosis, R temple    Dermoscopy with uniform and reassuring features.     Recent irritation and scaling could have a component of lichen planus like keratosis.     Discussed options for treatment. Told patient that if we were to excise the spot he would be trading the lesion for a scar, but would get pathologic confirmation of the diagnosis again.     Also discussed he possibility of treatment with lasers to reduce pigmentation. Discussed that laser treatment may require several treatment sessions.     Patient expressed interest in laser treatment and will be following up and scheduling an appointment with Dr. Longo.      Follow-up with Dr. Longo for laser consultation.     Staff Involved:    Scribe Disclosure  I, Michael Hernandez, am serving as a scribe to document services personally performed by Dr. Antonio Layne DO, based on data collection and the provider's statements to me.     Provider Disclosure:   The documentation recorded by the scribe accurately reflects the services I personally performed and the decisions made by me.    Antonio Layne DO    Department of Dermatology  [facility]: Phone: [phone], Fax:[phone]  [facility]: Phone: [phone], Fax: [phone]            Picture placed in chart for future reference.

## 2018-02-27 NOTE — MR AVS SNAPSHOT
After Visit Summary   2/27/2018    Rk Quesada    MRN: [id number]           Patient Information     Date Of Birth          1985        Visit Information        Provider Department      2/27/2018 3:15 PM Antonio Layne MD [facility] Dermatologic Surgery        Today's Diagnoses     Solar lentigo    -  1      Care Instructions                      Follow-ups after your visit        Who to contact     Please call your clinic at [phone] to:    Ask questions about your health    Make or cancel appointments    Discuss your medicines    Learn about your test results    Speak to your doctor            Additional Information About Your Visit        MyChart Information     [employer] gives you secure access to your electronic health record. If you see a primary care provider, you can also send messages to your care team and make appointments. If you have questions, please call your primary care clinic.  If you do not have a primary care provider, please call [phone] and they will assist you.      [employer] is an electronic gateway that provides easy, online access to your medical records. With [employer], you can request a clinic appointment, read your test results, renew a prescription or communicate with your care team.     To access your existing account, please contact your [facility] or call [phone] for assistance.        Care EveryWhere ID     This is your Care EveryWhere ID. This could be used by other organizations to access your [facility] medical records  [id number]         Blood Pressure from Last 3 Encounters:   06/07/17 138/87   05/11/17 134/79    Weight from Last 3 Encounters:   06/07/17 92.5 kg (204 lb)   05/11/17 93.9 kg (207 lb)              Today, you had the following     No orders found for display       Primary Care Provider Office Phone # Fax #    José Luis Pisano [phone]       [address]  [facility]  [address]        Equal Access to Services     [place] campusNEHEMIAH : Hadii aad ku hadelaynenahun Key, wapamelladanyelle shoemaker, johnnieluis boydakilclaudia harrell. So [facility] [phone].    ATENCIÓN: Si habla español, tiene a flores disposición servicios gratuitos de asistencia lingüística. Llame al [phone].    We comply with applicable federal civil rights laws and [place] laws. We do not discriminate on the basis of race, color, national origin, age, disability, sex, sexual orientation, or gender identity.            Thank you!     Thank you for choosing [facility] DERMATOLOGIC SURGERY  for your care. Our goal is always to provide you with excellent care. Hearing back from our patients is one way we can continue to improve our services. Please take a few minutes to complete the written survey that you may receive in the mail after your visit with us. Thank you!             Your Updated Medication List - Protect others around you: Learn how to safely use, store and throw away your medicines at www.disposemymeds.org.          This list is accurate as of 2/27/18  3:51 PM.  Always use your most recent med list.                   Brand Name Dispense Instructions for use Diagnosis    omeprazole 20 MG CR capsule    priLOSEC    30 capsule    Take 1 capsule (20 mg) by mouth daily    Gastroesophageal reflux disease, esophagitis presence not specified       triamcinolone 0.1 % cream    KENALOG    80 g    Apply topically 3 times daily    Lichen simplex chronicus

## 2018-04-23 ENCOUNTER — MYC MEDICAL ADVICE (OUTPATIENT)
Dept: FAMILY MEDICINE | Facility: CLINIC | Age: 33
End: 2018-04-23

## 2018-04-27 DIAGNOSIS — I86.1 VARICOCELE: Primary | ICD-10-CM

## 2018-05-30 ENCOUNTER — PRE VISIT (OUTPATIENT)
Dept: UROLOGY | Facility: CLINIC | Age: 33
End: 2018-05-30

## 2018-05-30 NOTE — TELEPHONE ENCOUNTER
Patient with history of varicocele coming in for consult with Dr. Nichols. Patient chart reviewed, no need for call, all records available and ready for appointment.

## 2018-05-31 ENCOUNTER — OFFICE VISIT (OUTPATIENT)
Dept: UROLOGY | Facility: CLINIC | Age: 33
End: 2018-05-31
Payer: COMMERCIAL

## 2018-05-31 VITALS
BODY MASS INDEX: 28.17 KG/M2 | DIASTOLIC BLOOD PRESSURE: 83 MMHG | HEIGHT: 72 IN | HEART RATE: 92 BPM | SYSTOLIC BLOOD PRESSURE: 148 MMHG | WEIGHT: 208 LBS

## 2018-05-31 DIAGNOSIS — I86.1 VARICOCELE: Primary | ICD-10-CM

## 2018-05-31 ASSESSMENT — PAIN SCALES - GENERAL: PAINLEVEL: NO PAIN (0)

## 2018-05-31 NOTE — MR AVS SNAPSHOT
After Visit Summary   5/31/2018    Rk Quesada    MRN: [id number]           Patient Information     Date Of Birth          1985        Visit Information        Provider Department      5/31/2018 1:20 PM Jas Nichols MD [facility]        Today's Diagnoses     Varicocele    -  1      Care Instructions    Imaging, semen analysis and labs.      It was a pleasure meeting with you today.  Thank you for allowing me and my team the privilege of caring for you today.  YOU are the reason we are here, and I truly hope we provided you with the excellent service you deserve.  Please let us know if there is anything else we can do for you so that we can be sure you are leaving completely satisfied with your care experience.                  Follow-ups after your visit        Your next 10 appointments already scheduled     Jun 05, 2018 10:00 AM CDT   LAB with  LAB   [facility] ([facility]    [address] [place] [id number] [phone]           Please do not eat 10-12 hours before your appointment if you are coming in fasting for labs on lipids, cholesterol, or glucose (sugar). This does not apply to pregnant women. Water, hot tea and black coffee (with nothing added) are okay. Do not drink other fluids, diet soda or chew gum.            Jun 05, 2018 10:30 AM CDT   US TESTICULAR AND SCROTUM WITH DOPPLER LIMITED with UCUS2   [facility] US ([facility])    [address] [place] [id number] [phone]           Please bring a list of your medicines (including vitamins, minerals and over-the-counter drugs). Also, tell your doctor about any allergies you may have. Wear comfortable clothes and leave your valuables at home.  You do not need to do anything special to prepare for your exam.  Please call the Imaging Department at your exam  site with any questions.              Future tests that were ordered for you today     Open Future Orders        Priority Expected Expires Ordered    US Testicular & Scrotum w Doppler Ltd Routine  5/31/2019 5/31/2018    Semen Analysis, Strict Morphology (ASHA) Routine  6/1/2019 5/31/2018            Who to contact     Please call your clinic at [phone] to:    Ask questions about your health    Make or cancel appointments    Discuss your medicines    Learn about your test results    Speak to your doctor            Additional Information About Your Visit        [employer] Information     [employer] gives you secure access to your electronic health record. If you see a primary care provider, you can also send messages to your care team and make appointments. If you have questions, please call your primary care clinic.  If you do not have a primary care provider, please call [phone] and they will assist you.      [employer] is an electronic gateway that provides easy, online access to your medical records. With [employer], you can request a clinic appointment, read your test results, renew a prescription or communicate with your care team.     To access your existing account, please contact your [facility] or call [phone] for assistance.        Care EveryWhere ID     This is your Care EveryWhere ID. This could be used by other organizations to access your [facility] medical records  [id number]        Your Vitals Were     Pulse Height BMI (Body Mass Index)             92 1.829 m (6') 28.21 kg/m2          Blood Pressure from Last 3 Encounters:   05/31/18 148/83   06/07/17 138/87   05/11/17 134/79    Weight from Last 3 Encounters:   05/31/18 94.3 kg (208 lb)   06/07/17 92.5 kg (204 lb)   05/11/17 93.9 kg (207 lb)              We Performed the Following     Estradiol ultrasensitive     Follicle stimulating hormone     Testosterone Free and Total        Primary Care Provider Office Phone # Gea  #    José Luis Pisano [phone]       [address]  [place] [address]        Equal Access to Services     NABIL MILLER : Hadii aad ku hadelaynenahun Sodylon, waaxda luqadaha, qaybta kaalmada aderoxyda, claudia devinin hayaaleona englandrandee montelongoanshul wilburn. So [facility] [phone].    ATENCIÓN: Si habla español, tiene a flores disposición servicios gratuitos de asistencia lingüística. Llame al [phone].    We comply with applicable federal civil rights laws and [place] laws. We do not discriminate on the basis of race, color, national origin, age, disability, sex, sexual orientation, or gender identity.            Thank you!     Thank you for choosing [facility] UROLOGY AND [facility] FOR PROSTATE AND UROLOGIC CANCERS  for your care. Our goal is always to provide you with excellent care. Hearing back from our patients is one way we can continue to improve our services. Please take a few minutes to complete the written survey that you may receive in the mail after your visit with us. Thank you!             Your Updated Medication List - Protect others around you: Learn how to safely use, store and throw away your medicines at www.disposemymeds.org.          This list is accurate as of 5/31/18  2:23 PM.  Always use your most recent med list.                   Brand Name Dispense Instructions for use Diagnosis    omeprazole 20 MG CR capsule    priLOSEC    30 capsule    Take 1 capsule (20 mg) by mouth daily    Gastroesophageal reflux disease, esophagitis presence not specified       triamcinolone 0.1 % cream    KENALOG    80 g    Apply topically 3 times daily    Lichen simplex chronicus

## 2018-05-31 NOTE — NURSING NOTE
Chief Complaint   Patient presents with     Consult     varicocele       Blood pressure 148/83, pulse 92, height 1.829 m (6'), weight 94.3 kg (208 lb). Body mass index is 28.21 kg/(m^2).    Patient Active Problem List   Diagnosis     Lichen simplex chronicus       No Known Allergies    Current Outpatient Prescriptions   Medication Sig Dispense Refill     omeprazole (PRILOSEC) 20 MG CR capsule Take 1 capsule (20 mg) by mouth daily (Patient not taking: Reported on 2/8/2018) 30 capsule 3     triamcinolone (KENALOG) 0.1 % cream Apply topically 3 times daily (Patient not taking: Reported on 6/7/2017) 80 g 0       Social History   Substance Use Topics     Smoking status: Never Smoker     Smokeless tobacco: Never Used     Alcohol use Yes      Comment: yseda Singletary LPN  5/31/2018  1:15 PM

## 2018-05-31 NOTE — LETTER
5/31/2018     RE: Rk Quesada  [address]  [facility] [address]     Dear Colleague,    Thank you for referring your patient, Rk Quesada, to the [facility] UROLOGY AND INST FOR PROSTATE AND UROLOGIC CANCERS at [facility]. Please see a copy of my visit note below.    Dear Dr. Pisano , it was my pleasure to see Mr. Rk Quesada, a 32 year old male here in consultation today for fertility evaluation.  His spouse is Ivet age 33 (11/10/84).    This couple wants to start trying for fertility. They have no previous pregnancy together.  Pregnancies with other partners: none.     Female factors suspected: none known but she does have some medical issues.    PAST MEDICAL HISTORY:    Past Medical History:   Diagnosis Date     Hand injury 2013    Tore Hand ligarment      Puberty normal   No associated conditions such as ED or sexual dysfunction.  No  problems.     PAST SURG HISTORY  Past Surgical History:   Procedure Laterality Date     HC TOOTH EXTRACTION W/FORCEP  2001     SURGICAL HISTORY OF -  Right     hand surgery     Medications as of 5/31/2018:  No prescription medications     ALLERGY:   No Known Allergies    SOCIAL HISTORY:   . Occupation: .  No alcohol abuse, no tobacco use.   Social History   Substance Use Topics     Smoking status: Never Smoker     Smokeless tobacco: Never Used     Alcohol use Yes      Comment: socially       FAMILY HISTORY: No inherited disorders.   Family History   Problem Relation Age of Onset     Hypertension Mother      Type 1 Diabetes Brother      Hypertension Maternal Grandmother        REVIEW OF SYSTEMS:  Significant for feeling well at present.    Denies erectile dysfunction, ejaculatory problems, testicular pain. No vision or smell deficits, no chronic sinus or respiratory infections. No recent febrile illness, weight loss. No heat or cold intolerance, gynecomastia, or other endocrine complaints.    Otherwise, no  constitutional, eye, ENT, heart, lung, GI , musculoskeletal, skin, neurologic, psychiatric, or hematologic complaints.    GONADOTOXIN EXPOSURE: Unremarkable. Otherwise negative for marijuana, heat, chemicals, pesticides, heavy metals, steroids, chemotherapy or radiation.    GENERAL PHYSICAL EXAM  /83  Pulse 92  Ht 1.829 m (6')  Wt 94.3 kg (208 lb)  BMI 28.21 kg/m2   Constitutional: No acute distress. Well nourished.   PSYCH: normal mood and affect.  NEURO: normal gait, no focal deficits.   EYES: anicteric, EOMI, PERR  ENT: neck supple,  mucosae moist, no thrush.  CARDIOPULMONARY: breathing non-labored, pulse regular, no peripheral edema.  GI: Abdomen soft, non-tender, no  surgical scars, no organomegaly.  MUSCULOSKELETAL: normal limb proportions, no muscle wasting, no contractures.  SKIN: Normal virilized hair distribution, no lesions, warts or rashes over genitalia, abdomen extremities or face.  HEME/LYMPH: no ecchymosis, no lymphadenopathy in groin or neck, no lymphedema.     EXAM:  Phallus circumcised, meatus adequate, no plaques palpated.   Left testis descended , size is 18 cc , consistency is normal . No intra-testicular masses.   Right testis descended , size is 18 cc , consistency is normal . No intra-testicular masses.   Epididymes present, non-tender, not enlarged.   Left cord: Vas present. Grade III  varicocele noted.  This is prominent like a round ball above above the left testis, does decompress when supine.  Right cord: Vas present.     Rectal exam deferred.       ASSESSMENT:     , left grade III varicocele ,       PLAN:    Hormonal panel    Semen analysis x 1-2.    Scrotal ultrasound to confirm no other pathology on the left testis.  Discussed risks, benefits, and alternatives of varix repair, including various methods.    I counseled him extensively on the nature of varicoceles, and their possible effects on testosterone production and fertility.  I described surgery and embolization  approaches, and the detailed risks of surgical repair.  These include damage to artery (ischemia), damage to lymphatics ( hydrocele), as well as risk of recurrence (</= 1%). Discussed that about 2/3rds of men see improved semen parameters and that improvement takes at least 3 months to see.         Discussed OTC supplements to consider taking    I will contact him with results and plan/options.    Thank-you for allowing me to care for your patient.  Sincerely,    Jas Nichols MD    CC: José Luis Pisano     Scrotal ultrasound June 5, 2018  Was negative for varicocele on the right.  I think I over-called this on exam.  Left varicocele was confirmed.  No other testis pathology noted.

## 2018-05-31 NOTE — PROGRESS NOTES
Dear Dr. Pisano , it was my pleasure to see Mr. Rk Quesada, a 32 year old male here in consultation today for fertility evaluation.  His spouse is Ivet age 33 (11/10/84).    This couple wants to start trying for fertility. They have no previous pregnancy together.  Pregnancies with other partners: none.     Female factors suspected: none known but she does have some medical issues.    PAST MEDICAL HISTORY:    Past Medical History:   Diagnosis Date     Hand injury 2013    Tore Hand ligarment      Puberty normal   No associated conditions such as ED or sexual dysfunction.  No  problems.     PAST SURG HISTORY  Past Surgical History:   Procedure Laterality Date     HC TOOTH EXTRACTION W/FORCEP  2001     SURGICAL HISTORY OF -  Right     hand surgery     Medications as of 5/31/2018:  No prescription medications     ALLERGY:   No Known Allergies    SOCIAL HISTORY:   . Occupation: .  No alcohol abuse, no tobacco use.   Social History   Substance Use Topics     Smoking status: Never Smoker     Smokeless tobacco: Never Used     Alcohol use Yes      Comment: socially       FAMILY HISTORY: No inherited disorders.   Family History   Problem Relation Age of Onset     Hypertension Mother      Type 1 Diabetes Brother      Hypertension Maternal Grandmother        REVIEW OF SYSTEMS:  Significant for feeling well at present.    Denies erectile dysfunction, ejaculatory problems, testicular pain. No vision or smell deficits, no chronic sinus or respiratory infections. No recent febrile illness, weight loss. No heat or cold intolerance, gynecomastia, or other endocrine complaints.    Otherwise, no constitutional, eye, ENT, heart, lung, GI , musculoskeletal, skin, neurologic, psychiatric, or hematologic complaints.    GONADOTOXIN EXPOSURE: Unremarkable. Otherwise negative for marijuana, heat, chemicals, pesticides, heavy metals, steroids, chemotherapy or radiation.    GENERAL PHYSICAL EXAM  /83  Pulse 92  Ht  1.829 m (6')  Wt 94.3 kg (208 lb)  BMI 28.21 kg/m2   Constitutional: No acute distress. Well nourished.   PSYCH: normal mood and affect.  NEURO: normal gait, no focal deficits.   EYES: anicteric, EOMI, PERR  ENT: neck supple,  mucosae moist, no thrush.  CARDIOPULMONARY: breathing non-labored, pulse regular, no peripheral edema.  GI: Abdomen soft, non-tender, no  surgical scars, no organomegaly.  MUSCULOSKELETAL: normal limb proportions, no muscle wasting, no contractures.  SKIN: Normal virilized hair distribution, no lesions, warts or rashes over genitalia, abdomen extremities or face.  HEME/LYMPH: no ecchymosis, no lymphadenopathy in groin or neck, no lymphedema.     EXAM:  Phallus circumcised, meatus adequate, no plaques palpated.   Left testis descended , size is 18 cc , consistency is normal . No intra-testicular masses.   Right testis descended , size is 18 cc , consistency is normal . No intra-testicular masses.   Epididymes present, non-tender, not enlarged.   Left cord: Vas present. Grade III  varicocele noted.  This is prominent like a round ball above above the left testis, does decompress when supine.  Right cord: Vas present.     Rectal exam deferred.       ASSESSMENT:     , left grade III varicocele ,       PLAN:    Hormonal panel    Semen analysis x 1-2.    Scrotal ultrasound to confirm no other pathology on the left testis.  Discussed risks, benefits, and alternatives of varix repair, including various methods.    I counseled him extensively on the nature of varicoceles, and their possible effects on testosterone production and fertility.  I described surgery and embolization approaches, and the detailed risks of surgical repair.  These include damage to artery (ischemia), damage to lymphatics ( hydrocele), as well as risk of recurrence (</= 1%). Discussed that about 2/3rds of men see improved semen parameters and that improvement takes at least 3 months to see.         Discussed OTC supplements  to consider taking    I will contact him with results and plan/options.    Thank-you for allowing me to care for your patient.  Sincerely,    Jas Nichols MD      CC: José Luis Pisano       Scrotal ultrasound June 5, 2018  Was negative for varicocele on the right.  I think I over-called this on exam.  Left varicocele was confirmed.  No other testis pathology noted.

## 2018-06-05 ENCOUNTER — RADIANT APPOINTMENT (OUTPATIENT)
Dept: ULTRASOUND IMAGING | Facility: CLINIC | Age: 33
End: 2018-06-05
Attending: UROLOGY
Payer: COMMERCIAL

## 2018-06-05 DIAGNOSIS — I86.1 VARICOCELE: ICD-10-CM

## 2018-06-05 LAB — FSH SERPL-ACNC: 3.8 IU/L (ref 0.7–10.8)

## 2018-06-05 NOTE — PROGRESS NOTES
Dear Rk,   Here are your recent results.     Your scrotal ultrasound shows a left-sided varicocele.  There was not a right varicocele identified, so I think my exam was wrong to call a varicocele on the right side.  There was no other testis pathology seen.  Repair of the left varicocele may improve your semen parameters/ fertility.    I will contact you with the blood lab results when they are back.  Thank You  Please let me know if you have any questions.     Jas Nichols MD

## 2018-06-07 LAB
SHBG SERPL-SCNC: 29 NMOL/L (ref 11–80)
TESTOST FREE SERPL-MCNC: 8.75 NG/DL (ref 4.7–24.4)
TESTOST SERPL-MCNC: 400 NG/DL (ref 240–950)

## 2018-06-12 LAB — ESTRADIOL SERPL HS-MCNC: 20 PG/ML (ref 10–40)

## 2018-06-19 NOTE — PROGRESS NOTES
Dear Rk,   Here are your recent results.     Blood labs are all normal, no concerns.    Calculated free ( active) testosterone is 8.75 ng/dL ( >4.7 is normal and >6.5 is preferred), so testosterone level looks great.   There is a normal testosterone to estrogen ratio.  FSH is the signal from the brain to the testicles to drive sperm production.  Your FSH level is normal, I prefer to see this under 7.5 or so.    Thank You  Let me know if you have any questions.  I'm happy to see you back to discuss results in depth.    Yasmeen TOLEDO

## 2018-11-27 ENCOUNTER — OFFICE VISIT (OUTPATIENT)
Dept: FAMILY MEDICINE | Facility: CLINIC | Age: 33
End: 2018-11-27
Payer: COMMERCIAL

## 2018-11-27 VITALS
DIASTOLIC BLOOD PRESSURE: 95 MMHG | OXYGEN SATURATION: 100 % | BODY MASS INDEX: 28.48 KG/M2 | SYSTOLIC BLOOD PRESSURE: 148 MMHG | TEMPERATURE: 97.3 F | HEIGHT: 72 IN | HEART RATE: 84 BPM | WEIGHT: 210.25 LBS

## 2018-11-27 DIAGNOSIS — Z23 FLU VACCINE NEED: ICD-10-CM

## 2018-11-27 DIAGNOSIS — M25.561 POSTERIOR RIGHT KNEE PAIN: Primary | ICD-10-CM

## 2018-11-27 DIAGNOSIS — R03.0 ELEVATED BLOOD PRESSURE READING WITHOUT DIAGNOSIS OF HYPERTENSION: ICD-10-CM

## 2018-11-27 NOTE — NURSING NOTE
"33 year old  Chief Complaint   Patient presents with     Knee Pain     right knee pain pt reports has been swollena dn painful for last 6 days no injury that he can recall     Imm/Inj     Flu Vaccine       Blood pressure (!) 153/91, pulse 84, temperature 97.3  F (36.3  C), temperature source Oral, height 6' 0.01\" (182.9 cm), weight 210 lb 4 oz (95.4 kg), SpO2 100 %. Body mass index is 28.51 kg/(m^2).  Patient Active Problem List   Diagnosis     Lichen simplex chronicus       Wt Readings from Last 2 Encounters:   11/27/18 210 lb 4 oz (95.4 kg)   05/31/18 208 lb (94.3 kg)     BP Readings from Last 3 Encounters:   11/27/18 (!) 153/91   05/31/18 148/83   06/07/17 138/87         Current Outpatient Prescriptions   Medication     omeprazole (PRILOSEC) 20 MG CR capsule     triamcinolone (KENALOG) 0.1 % cream     No current facility-administered medications for this visit.        Social History   Substance Use Topics     Smoking status: Never Smoker     Smokeless tobacco: Never Used     Alcohol use Yes      Comment: socially       Health Maintenance Due   Topic Date Due     HIV SCREEN (SYSTEM ASSIGNED)  08/15/2002     PHQ-2 Q1 YR  06/07/2018     INFLUENZA VACCINE (1) 09/01/2018       No results found for: PAP      November 27, 2018 1:43 PM  "

## 2018-11-27 NOTE — NURSING NOTE
"Injectable Influenza Immunization Documentation    1.  Has the patient received the information for the injectable influenza vaccine? YES     2. Is the patient 6 months of age or older? YES     3. Does the patient have any of the following contraindications?         Severe allergy to eggs? No     Severe allergic reaction to previous influenza vaccines? No   Severe allergy to latex? No       History of Guillain-Bartlett syndrome? No     Currently have a temperature greater than 100.4F? No        4.  Severely egg allergic patients should have flu vaccine eligibility assessed by an MD, RN, or pharmacist, and those who received flu vaccine should be observed for 15 min by an MD, RN, Pharmacist, Medical Technician, or member of clinic staff.\": YES    5. Latex-allergic patients should be given latex-free influenza vaccine Yes. Please reference the Vaccine latex table to determine if your clinic s product is latex-containing.       Vaccination given by JAMES Ching        "

## 2018-11-27 NOTE — PROGRESS NOTES
SUBJECTIVE:   Rk Quesada is a 33 year old male who presents to clinic today for the following health issues:    RIGHT knee Pain    Onset: 11/21/18    Description: He was bowling 11/21/18 and he woke up the next morning with a sore, swollen RIGHT knee. No trauma. His knee has been swollen and tight since. Bending his leg and walking up stairs worsen the pain.   Location: right knee, he feels that the back of his knee is tight and swollen.    Character: Dull ache    Intensity: mild    Progression of Symptoms: same    Accompanying Signs & Symptoms:  Other symptoms: swelling    History:   Previous similar pain: YES- notes a longstanding history of knee pain. He has not done physical therapy in the past.     Precipitating factors:   Trauma or overuse: no     Alleviating factors:  Improved by: nothing  Therapies Tried and outcome: None.   He regularly runs and lifts.       Elevated BP without diagnoses of hypertension. Has been mildly elevated in the pre-hypertensive range in the past. Patient denies symptoms associated with high blood pressure including blurred vision, headache, chest pain, heart palpitations, shortness of breath and pedal edema.    BP Readings from Last 3 Encounters:   11/27/18 (!) 148/95   05/31/18 148/83   06/07/17 138/87       Problem list and histories reviewed & adjusted, as indicated.  Additional history: as documented    Patient Active Problem List   Diagnosis     Lichen simplex chronicus     Elevated blood pressure reading without diagnosis of hypertension     Past Surgical History:   Procedure Laterality Date     HC TOOTH EXTRACTION W/FORCEP  2001     SURGICAL HISTORY OF -  Right        Social History   Substance Use Topics     Smoking status: Never Smoker     Smokeless tobacco: Never Used     Alcohol use Yes      Comment: socially     Family History   Problem Relation Age of Onset     Hypertension Mother      Type 1 Diabetes Brother      Hypertension Maternal Grandmother          Current  "Outpatient Prescriptions   Medication Sig Dispense Refill     omeprazole (PRILOSEC) 20 MG CR capsule Take 1 capsule (20 mg) by mouth daily 30 capsule 3     triamcinolone (KENALOG) 0.1 % cream Apply topically 3 times daily 80 g 0     No Known Allergies    Reviewed and updated as needed this visit by clinical staff  Tobacco  Allergies  Meds  Problems       Reviewed and updated as needed this visit by Provider  Allergies  Meds  Problems         ROS:  Constitutional, HEENT, cardiovascular, pulmonary, gi and gu systems are negative, except as otherwise noted.    OBJECTIVE:     BP (!) 148/95  Pulse 84  Temp 97.3  F (36.3  C) (Oral)  Ht 6' 0.01\" (182.9 cm)  Wt 210 lb 4 oz (95.4 kg)  SpO2 100%  BMI 28.51 kg/m2  Body mass index is 28.51 kg/(m^2).  GENERAL: healthy, alert and no distress  Heart:  RRR without murmur  Lung:  CTA    RIGHT knee:   Observation: No redness or warmth. Generalized swelling of the right knee without effusion.   Palpation: No pain along medial or lateral joint line. No tenderness of the joint line or patellar tendons.   Range of motion: Full with noted lateral crepitance of the knee cap.    Special Tests: Ligamentously stable.     LEFT knee appears normal.     MS:   Tenderness posterior hamstring insertion and top of the calf. Some tenderness with knee extension. No palpable tenderness swelling or nodules. Very tight hamstrings and lumbar area.      ASSESSMENT/PLAN:       ICD-10-CM    1. Posterior right knee pain M25.561 Hamstring strain from new activity.  Very tight hamstrings   2. Elevated blood pressure reading without diagnosis of hypertension R03.0    3. Flu vaccine need Z23 C RIV4 (FLUBLOK) VACCINE RECOMBINANT DNA PRSRV ANTIBIO FREE, IM     ADMIN INFLUENZA VIRUS VACCINE     Suspect knee issue is more of a hamstring calf tendinitis/strain from bowling.  Recommended heating pad mild stretching and massage.  Ibuprofen can be helpful for pain as well follow-up if any worsening or " persistent problems or concerns. Patient has very tight hamstrings and would benefit from using a foam roller.  Patient appears to have some patellofemoral malalignment with crepitance on range of motion not related to today's problem.  I encouraged him to consider physical therapy for quadriceps strengthening and consider taping of the kneecaps (this needs to be taught by the PT0 to prevent further damage.  See patient instructions.  Repeat BP  Today was elevated.  Encouraged checking over the next 2-3 weeks (at pharmacy or here in clinic with RN) and make an appointment to discuss treatment if consistently above 135/85.  Important to sit quietly for 10-15 minutes prior to checking. DASH diet.        Smiley Cheung PA-C  [facility]    I, Gloria Lewis, am serving as a scribe to document services personally performed by Smiley Cheung PA-C, based on data collection and the provider's statements to me. Smiley Cheung PA-C, has reviewed, edited, and approve the above note.

## 2018-11-27 NOTE — MR AVS SNAPSHOT
After Visit Summary   11/27/2018    Rk Quesada    MRN: [id number]           Patient Information     Date Of Birth          1985        Visit Information        Provider Department      11/27/2018 1:40 PM Smiley Cheung PA-C [facility]        Today's Diagnoses     Posterior right knee pain    -  1    Elevated blood pressure reading without diagnosis of hypertension        Flu vaccine need          Care Instructions    Using a foam roller on the hamstrings and calf daily would be very helpful.  Hamstring Stretch (with Towel)    To start, lie on your back with your knees bent and feet flat on the floor. Don t press your neck or lower back to the floor. Breathe deeply. You should feel comfortable and relaxed in this position.  Here are the steps to the hamstring stretch:    Put a towel behind one knee or calf.    Use the towel to pull the leg toward your chest, keeping the leg straight or slightly bent.    Hold for 30 to 60 seconds. Then lower the leg.    Repeat 2 times.    Switch legs.   For your safety, check with your healthcare provider before starting an exercise program.   Date Last Reviewed: 11/1/2017 2000-2018 [employer]. [address] [place] [address]. All rights reserved. This information is not intended as a substitute for professional medical care. Always follow your healthcare professional's instructions.        Seated Hamstring Stretch    The following flexibility exercise may be suggested by your physical therapist. Stop the exercise if it causes pain and discuss it with your physical therapist or healthcare provider. During the exercise, be sure not to bounce.  Here are the steps to the seated hamstring stretch:    Sit with one leg extended and your back straight. Bend your other leg so that the sole of your foot rests against your mid-thigh.    Reach toward your ankle. Keep your knee, neck, and back straight.    Feel the stretch in the back  "of your thigh.    Hold for 30 to 60 seconds. Repeat 2 times.    Repeat __ times per day.  For your safety, check with your healthcare provider before starting an exercise program.   Date Last Reviewed: 11/1/2017 2000-2018 [employer]. [address] [place] [address]. All rights reserved. This information is not intended as a substitute for professional medical care. Always follow your healthcare professional's instructions.                Follow-ups after your visit        Who to contact     Please call your clinic at [phone] to:    Ask questions about your health    Make or cancel appointments    Discuss your medicines    Learn about your test results    Speak to your doctor            Additional Information About Your Visit        [employer] Information     [employer] gives you secure access to your electronic health record. If you see a primary care provider, you can also send messages to your care team and make appointments. If you have questions, please call your primary care clinic.  If you do not have a primary care provider, please call [phone] and they will assist you.      [employer] is an electronic gateway that provides easy, online access to your medical records. With [employer], you can request a clinic appointment, read your test results, renew a prescription or communicate with your care team.     To access your existing account, please contact your [facility] or call [phone] for assistance.        Care EveryWhere ID     This is your Care EveryWhere ID. This could be used by other organizations to access your [facility] medical records  [id number]        Your Vitals Were     Pulse Temperature Height Pulse Oximetry BMI (Body Mass Index)       84 97.3  F (36.3  C) (Oral) 6' 0.01\" (182.9 cm) 100% 28.51 kg/m2        Blood Pressure from Last 3 Encounters:   11/27/18 (!) 148/95   05/31/18 148/83   06/07/17 138/87    Weight from Last 3 Encounters: "   11/27/18 210 lb 4 oz (95.4 kg)   05/31/18 208 lb (94.3 kg)   06/07/17 204 lb (92.5 kg)              We Performed the Following     ADMIN INFLUENZA VIRUS VACCINE     C RIV4 (FLUBLOK) VACCINE RECOMBINANT DNA PRSRV ANTIBIO FREE, IM        Primary Care Provider Office Phone # Fax #    José Luis Pisano [phone]       [address]  [facility] [address]        Equal Access to Services     NABIL MILLER : Hadii aad ku hadasho Soomaali, waaxda luqadaha, qaybta kaalmada adeegyada, waxay idiin hayaan neha kimbrough . So [facility] [phone].    ATENCIÓN: Si habla español, tiene a flores disposición servicios gratuitos de asistencia lingüística. [facility] [phone].    We comply with applicable federal civil rights laws and [place] laws. We do not discriminate on the basis of race, color, national origin, age, disability, sex, sexual orientation, or gender identity.            Thank you!     Thank you for choosing [facility]  for your care. Our goal is always to provide you with excellent care. Hearing back from our patients is one way we can continue to improve our services. Please take a few minutes to complete the written survey that you may receive in the mail after your visit with us. Thank you!             Your Updated Medication List - Protect others around you: Learn how to safely use, store and throw away your medicines at www.disposemymeds.org.          This list is accurate as of 11/27/18 11:59 PM.  Always use your most recent med list.                   Brand Name Dispense Instructions for use Diagnosis    omeprazole 20 MG DR capsule    priLOSEC    30 capsule    Take 1 capsule (20 mg) by mouth daily    Gastroesophageal reflux disease, esophagitis presence not specified       triamcinolone 0.1 % external cream    KENALOG    80 g    Apply topically 3 times daily    Lichen simplex chronicus

## 2018-11-28 NOTE — PATIENT INSTRUCTIONS
Using a foam roller on the hamstrings and calf daily would be very helpful.  Hamstring Stretch (with Towel)    To start, lie on your back with your knees bent and feet flat on the floor. Don t press your neck or lower back to the floor. Breathe deeply. You should feel comfortable and relaxed in this position.  Here are the steps to the hamstring stretch:    Put a towel behind one knee or calf.    Use the towel to pull the leg toward your chest, keeping the leg straight or slightly bent.    Hold for 30 to 60 seconds. Then lower the leg.    Repeat 2 times.    Switch legs.   For your safety, check with your healthcare provider before starting an exercise program.   Date Last Reviewed: 11/1/2017 2000-2018 [employer]. [address] [place] [address]. All rights reserved. This information is not intended as a substitute for professional medical care. Always follow your healthcare professional's instructions.        Seated Hamstring Stretch    The following flexibility exercise may be suggested by your physical therapist. Stop the exercise if it causes pain and discuss it with your physical therapist or healthcare provider. During the exercise, be sure not to bounce.  Here are the steps to the seated hamstring stretch:    Sit with one leg extended and your back straight. Bend your other leg so that the sole of your foot rests against your mid-thigh.    Reach toward your ankle. Keep your knee, neck, and back straight.    Feel the stretch in the back of your thigh.    Hold for 30 to 60 seconds. Repeat 2 times.    Repeat __ times per day.  For your safety, check with your healthcare provider before starting an exercise program.   Date Last Reviewed: 11/1/2017 2000-2018 [employer]. [address] [place] [address]. All rights reserved. This information is not intended as a substitute for professional medical care. Always follow your healthcare professional's  instructions.

## 2019-04-02 ENCOUNTER — ANCILLARY PROCEDURE (OUTPATIENT)
Dept: ULTRASOUND IMAGING | Facility: CLINIC | Age: 34
End: 2019-04-02
Attending: FAMILY MEDICINE
Payer: COMMERCIAL

## 2019-04-02 ENCOUNTER — OFFICE VISIT (OUTPATIENT)
Dept: FAMILY MEDICINE | Facility: CLINIC | Age: 34
End: 2019-04-02
Payer: COMMERCIAL

## 2019-04-02 VITALS
WEIGHT: 208 LBS | HEART RATE: 70 BPM | BODY MASS INDEX: 28.2 KG/M2 | TEMPERATURE: 97.7 F | OXYGEN SATURATION: 98 % | RESPIRATION RATE: 16 BRPM | DIASTOLIC BLOOD PRESSURE: 81 MMHG | SYSTOLIC BLOOD PRESSURE: 139 MMHG

## 2019-04-02 DIAGNOSIS — L08.9 FINGER INFECTION: ICD-10-CM

## 2019-04-02 DIAGNOSIS — L08.9 FINGER INFECTION: Primary | ICD-10-CM

## 2019-04-02 RX ORDER — MUPIROCIN CALCIUM 20 MG/G
CREAM TOPICAL 3 TIMES DAILY
Qty: 15 G | Refills: 0 | Status: SHIPPED | OUTPATIENT
Start: 2019-04-02 | End: 2021-07-21

## 2019-04-02 RX ORDER — DICLOXACILLIN SODIUM 500 MG
500 CAPSULE ORAL 4 TIMES DAILY
Qty: 20 CAPSULE | Refills: 0 | Status: SHIPPED | OUTPATIENT
Start: 2019-04-02 | End: 2019-04-07

## 2019-04-02 NOTE — PROGRESS NOTES
SUBJECTIVE:   Rk Quesada is a 33 year old male who presents to clinic today for a return visit.    # Finger Laceration  - working under a sink, pulled his hand out and scraped hand against wood under sink  - happened about 3.5 weeks ago  - doesn't feel that it has healed as well as it should  - right hand 4th finger PIP   - squeeze this area last week and had purulent discharge  - still painful to the touch  - mildly painful with flexion  - not painful at rest    ROS: Denies fevers, chills, chest pain, difficulty breathing, abdominal pain    Patient Active Problem List   Diagnosis     Lichen simplex chronicus     Elevated blood pressure reading without diagnosis of hypertension     Current Outpatient Medications   Medication     dicloxacillin (DYNAPEN) 500 MG capsule     mupirocin (BACTROBAN) 2 % external cream     triamcinolone (KENALOG) 0.1 % cream     omeprazole (PRILOSEC) 20 MG CR capsule     No current facility-administered medications for this visit.      I have reviewed the patient's relevant past medical history.     OBJECTIVE:   /81 (BP Location: Right arm, Patient Position: Sitting, Cuff Size: Adult Large)   Pulse 70   Temp 97.7  F (36.5  C) (Oral)   Resp 16   Wt 94.3 kg (208 lb)   SpO2 98%   BMI 28.20 kg/m      Constitutional: well-appearing, appears stated age  Eyes: conjunctivae without erythema, sclera anicteric.   Cardiac: regular rate and rhythm, normal S1/S2, no murmur/rubs/gallops  Respiratory: lungs clear to auscultation bilaterally, normal work of breathing, no wheezes/crackles  Skin: right hand 3rd finger has healing small excoriation with surrounding pink skin over PIP.  Right hand 4th finger has erythematous raised area over PIP. This area is tender to palpation but palpation of the portion of his PIP joint that does not have overlying erythema is not tender. Has full active ROM of that finger. Cap refill brisk distally.   Psych: affect is full and appropriate, speech is fluent  and non-pressured    ASSESSMENT AND PLAN:     (L08.9) Finger infection  (primary encounter diagnosis)  Comment: Given the prolonged erythema and pain and the fact that he already expressed some purulence from the site just last week (2 weeks after initial injury) has me rather concerned for a soft tissue infection in his finger. I do not believe that he has a joint infection at this time - minimal pain with good ROM, no joint tenderness on exam. I cautioned him repeatedly about how serious these infections can be given their proximity to the joint and the small area of the finger. I will start with checking an US to evaluate for a foreign body and for a drainable collection underneath. I have recommended that he start dicloxacillin to treat a possible soft tissue infection. He is very hesitant to start oral antibiotics and requested a topical antibiotic to try for a day or two first. I prescribed it but made it very clear to him that this is not my recommendation for him and that I would start the oral antibiotic.  I have advised him that if the finger is not improving after 48 hours or if it worsens at any time I would recommend he be evaluated by hand surgery.  Plan: US Extremity Non Vascular Right, mupirocin         (BACTROBAN) 2 % external cream, dicloxacillin         (DYNAPEN) 500 MG capsule          Jas Gutierrez   [facility]  04/02/2019, 11:35 AM

## 2019-04-02 NOTE — NURSING NOTE
33 year old  Chief Complaint   Patient presents with     Abrasion     right 4th finger x 3 weeks ago, not warm to the touch       Blood pressure 139/81, pulse 70, temperature 97.7  F (36.5  C), temperature source Oral, resp. rate 16, weight 94.3 kg (208 lb), SpO2 98 %. Body mass index is 28.2 kg/m .  Patient Active Problem List   Diagnosis     Lichen simplex chronicus     Elevated blood pressure reading without diagnosis of hypertension       Wt Readings from Last 2 Encounters:   04/02/19 94.3 kg (208 lb)   11/27/18 95.4 kg (210 lb 4 oz)     BP Readings from Last 3 Encounters:   04/02/19 139/81   11/27/18 (!) 148/95   05/31/18 148/83         Current Outpatient Medications   Medication     triamcinolone (KENALOG) 0.1 % cream     omeprazole (PRILOSEC) 20 MG CR capsule     No current facility-administered medications for this visit.        Social History     Tobacco Use     Smoking status: Never Smoker     Smokeless tobacco: Never Used   Substance Use Topics     Alcohol use: Yes     Comment: socially     Drug use: No       Health Maintenance Due   Topic Date Due     HIV SCREEN (SYSTEM ASSIGNED)  08/15/2002     PREVENTIVE CARE VISIT  06/07/2018       No results found for: PAP      April 2, 2019 10:26 AM

## 2019-04-05 ENCOUNTER — MYC MEDICAL ADVICE (OUTPATIENT)
Dept: FAMILY MEDICINE | Facility: CLINIC | Age: 34
End: 2019-04-05

## 2019-04-05 DIAGNOSIS — L08.9 FINGER INFECTION: Primary | ICD-10-CM

## 2019-04-05 RX ORDER — DICLOXACILLIN SODIUM 500 MG
500 CAPSULE ORAL 4 TIMES DAILY
Qty: 8 CAPSULE | Refills: 0 | Status: SHIPPED | OUTPATIENT
Start: 2019-04-05 | End: 2019-04-07

## 2019-04-05 NOTE — TELEPHONE ENCOUNTER
Called patient.    Finger is about the same in appearance but pain has improved  Started dicloxacillin Tuesday  Given that he has had some improvement, will extend course of dicloxacillin to full 7 days  If not continuing to improve come Monday or sooner if worsening, I have advised he go to the [facility] ortho walk-in clinic for evaluation for possible joint infection.    Jas Gutierrez MD on 4/5/2019 at 4:18 PM

## 2020-03-10 ENCOUNTER — HEALTH MAINTENANCE LETTER (OUTPATIENT)
Age: 35
End: 2020-03-10

## 2020-12-27 ENCOUNTER — HEALTH MAINTENANCE LETTER (OUTPATIENT)
Age: 35
End: 2020-12-27

## 2021-01-04 ENCOUNTER — OFFICE VISIT (OUTPATIENT)
Dept: FAMILY MEDICINE | Facility: CLINIC | Age: 36
End: 2021-01-04
Payer: COMMERCIAL

## 2021-01-04 VITALS
RESPIRATION RATE: 15 BRPM | HEIGHT: 73 IN | HEART RATE: 94 BPM | SYSTOLIC BLOOD PRESSURE: 142 MMHG | WEIGHT: 219 LBS | OXYGEN SATURATION: 97 % | BODY MASS INDEX: 29.03 KG/M2 | DIASTOLIC BLOOD PRESSURE: 87 MMHG | TEMPERATURE: 97.2 F

## 2021-01-04 DIAGNOSIS — S59.901A ELBOW INJURY, RIGHT, INITIAL ENCOUNTER: Primary | ICD-10-CM

## 2021-01-04 ASSESSMENT — MIFFLIN-ST. JEOR: SCORE: 1977.26

## 2021-01-04 NOTE — PATIENT INSTRUCTIONS
ASSESSMENT/PLAN:    RIGHT elbow medial bursitis  - Await official radiology interpretation  - Try Voltaren gel 2- 4 times/day  - If no improvement, then will refer to Hand therapy as next step.       --José Luis Pisano MD  [facility], Department of Family Medicine and Community Health

## 2021-01-04 NOTE — NURSING NOTE
"35 year old  Chief Complaint   Patient presents with     Elbow Pain     right elbow pain x 2-3 months       Blood pressure (!) 140/88, pulse 94, temperature 97.2  F (36.2  C), temperature source Skin, resp. rate 15, height 1.854 m (6' 1\"), weight 99.3 kg (219 lb), SpO2 97 %. Body mass index is 28.89 kg/m .  Patient Active Problem List   Diagnosis     Lichen simplex chronicus     Elevated blood pressure reading without diagnosis of hypertension       Wt Readings from Last 2 Encounters:   01/04/21 99.3 kg (219 lb)   04/02/19 94.3 kg (208 lb)     BP Readings from Last 3 Encounters:   01/04/21 (!) 140/88   04/02/19 139/81   11/27/18 (!) 148/95         Current Outpatient Medications   Medication     mupirocin (BACTROBAN) 2 % external cream     triamcinolone (KENALOG) 0.1 % cream     omeprazole (PRILOSEC) 20 MG CR capsule     No current facility-administered medications for this visit.        Social History     Tobacco Use     Smoking status: Never Smoker     Smokeless tobacco: Never Used   Substance Use Topics     Alcohol use: Yes     Comment: socially     Drug use: No       Health Maintenance Due   Topic Date Due     HIV SCREENING  08/15/1999     HEPATITIS C SCREENING  08/15/2002     PREVENTIVE CARE VISIT  06/07/2018     INFLUENZA VACCINE (1) 09/01/2020       No results found for: PAP      January 4, 2021 3:23 PM    "

## 2021-01-04 NOTE — PROGRESS NOTES
"Rk Quesada is a 35 year old male who presents to [facility] today due to RIGHT elbow pain. About 2-3 months ago, he was playing with daughter on a slide and suffered a contusion.   No pain at rest.   Has pain with there's pressure across the arm, e.g. if one were to hold a basketball.       Review Of Systems:    Has otherwise been in usual state of health, e.g.   Cardiovascular: negative  Respiratory: No shortness of breath, dyspnea on exertion, cough, or hemoptysis  Gastrointestinal: negative  Genitourinary: negative      Problem list per EMR:  Patient Active Problem List   Diagnosis     Lichen simplex chronicus     Elevated blood pressure reading without diagnosis of hypertension       Current Outpatient Medications   Medication Sig Dispense Refill     mupirocin (BACTROBAN) 2 % external cream Apply topically 3 times daily 15 g 0     triamcinolone (KENALOG) 0.1 % cream Apply topically 3 times daily 80 g 0     omeprazole (PRILOSEC) 20 MG CR capsule Take 1 capsule (20 mg) by mouth daily (Patient not taking: Reported on 4/2/2019) 30 capsule 3       No Known Allergies       Social:   Social History     Social History Narrative    Moved back to [place] from [facility] in 2016.  Wife has suffered with chronic Lyme disease.      Now living in [place].    He works as a medicinal and analytical chemistry.     Had a daughter in the year 2019.          EXAM    Vitals: BP (!) 140/88 (BP Location: Left arm, Patient Position: Sitting, Cuff Size: Adult Large)   Pulse 94   Temp 97.2  F (36.2  C) (Skin)   Resp 15   Ht 1.854 m (6' 1\")   Wt 99.3 kg (219 lb)   SpO2 97%   BMI 28.89 kg/m    BMI= Body mass index is 28.89 kg/m .  Repeat:  BP (!) 142/87   Pulse 94   Temp 97.2  F (36.2  C) (Skin)   Resp 15   Ht 1.854 m (6' 1\")   Wt 99.3 kg (219 lb)   SpO2 97%   BMI 28.89 kg/m    Appears well and in no distress.  RIGHT elbow:  No redness, warmth or deformity  Tender over the proximal aspect of the medial epicondyle of " the elbow  Full ROM including extension, flexion, pronation and supination.   No pain with stress of the UCL or with stress of the wrist flexor tendons.   No laxity of UCL    Wt Readings from Last 5 Encounters:   01/04/21 99.3 kg (219 lb)   04/02/19 94.3 kg (208 lb)   11/27/18 95.4 kg (210 lb 4 oz)   05/31/18 94.3 kg (208 lb)   06/07/17 92.5 kg (204 lb)       RIGHT elbow x-ray - 3 views: normal    ASSESSMENT/PLAN:    RIGHT elbow medial bursitis  - Await official radiology interpretation  - Try Voltaren gel 2- 4 times/day  - If no improvement, then will refer to Hand therapy as next step.     Also, discussed the blood pressure. Recommend trying to lose about 10 lbs. Follow BP at home and if levels above 140/90, return to clinic    --José Luis Pisano MD  [facility], Department of Family Medicine and Community Health

## 2021-04-24 ENCOUNTER — HEALTH MAINTENANCE LETTER (OUTPATIENT)
Age: 36
End: 2021-04-24

## 2021-07-21 ENCOUNTER — OFFICE VISIT (OUTPATIENT)
Dept: FAMILY MEDICINE | Facility: CLINIC | Age: 36
End: 2021-07-21
Payer: COMMERCIAL

## 2021-07-21 VITALS
HEART RATE: 88 BPM | RESPIRATION RATE: 15 BRPM | DIASTOLIC BLOOD PRESSURE: 95 MMHG | HEIGHT: 73 IN | SYSTOLIC BLOOD PRESSURE: 152 MMHG | BODY MASS INDEX: 29.06 KG/M2 | WEIGHT: 219.25 LBS | TEMPERATURE: 97.6 F | OXYGEN SATURATION: 97 %

## 2021-07-21 DIAGNOSIS — R03.0 ELEVATED BLOOD PRESSURE READING WITHOUT DIAGNOSIS OF HYPERTENSION: ICD-10-CM

## 2021-07-21 DIAGNOSIS — R06.83 SNORING: Primary | ICD-10-CM

## 2021-07-21 ASSESSMENT — MIFFLIN-ST. JEOR: SCORE: 1978.39

## 2021-07-21 NOTE — NURSING NOTE
"35 year old  Chief Complaint   Patient presents with     Snoring     getting worse per wife       Blood pressure (!) 180/87, pulse 88, temperature 97.6  F (36.4  C), temperature source Skin, resp. rate 15, height 1.854 m (6' 1\"), weight 99.5 kg (219 lb 4 oz), SpO2 97 %. Body mass index is 28.93 kg/m .  Patient Active Problem List   Diagnosis     Lichen simplex chronicus     Elevated blood pressure reading without diagnosis of hypertension       Wt Readings from Last 2 Encounters:   07/21/21 99.5 kg (219 lb 4 oz)   01/04/21 99.3 kg (219 lb)     BP Readings from Last 3 Encounters:   07/21/21 (!) 180/87   01/04/21 (!) 142/87   04/02/19 139/81         Current Outpatient Medications   Medication     diclofenac (VOLTAREN) 1 % topical gel     mupirocin (BACTROBAN) 2 % external cream     omeprazole (PRILOSEC) 20 MG CR capsule     triamcinolone (KENALOG) 0.1 % cream     No current facility-administered medications for this visit.       Social History     Tobacco Use     Smoking status: Never Smoker     Smokeless tobacco: Never Used   Substance Use Topics     Alcohol use: Yes     Comment: socially     Drug use: No       Health Maintenance Due   Topic Date Due     ADVANCE CARE PLANNING  Never done     HIV SCREENING  Never done     HEPATITIS C SCREENING  Never done     PREVENTIVE CARE VISIT  06/07/2018       No results found for: PAP      July 21, 2021 1:05 PM  ]  "

## 2021-07-21 NOTE — PATIENT INSTRUCTIONS
ASSESSMENT/PLAN:      Rk is a 36 yo with signs and symptoms of early sleep apnea.   Also, with borderline hypertension and increasing weight  1. Discussed the interrelationships of weight, sleep and BP  2. Discussed sending for sleep study now.   3. Elected to work on weight loss. I believe that he would see a benefit in BP and snoring with even 10 lbs of weight loss.   4. Ultimate goal may be to be around 200 lbs, but will reassess over time  5. For diet: Add more proteins earlier in the day. Decrease potatoes at dinner (look into healthier substitutes with lower carbohydrates). Avoid chips if possible  6. For exercise, ask your sister about personal trainers or cross-fit possibilities. Aim for 4-5 times/week and 45-60 mins/session. Also, daily walks in the morning and midday. Obtain a standing desk. I described how to use desk intermittently. I think that it could help his general health  7. Weigh yourself and take BP daily. Record numbers on a phone rina  8. Take Magnesium 325-400 mg at bedtime. This can help lower BP and also help with sleep.  9. Look into garlic and other dietary strategies for blood pressure  10. Discussed strategies for healthy sleep at home.   11. Return in 6-8 weeks for annual exam and check of above. If not making progress with send for sleep study.     --José Luis Pisano MD

## 2021-07-21 NOTE — PROGRESS NOTES
OVERVIEW: Rk Quesada is a 35 year old male who presents to [facility] today for Snoring (getting worse per wife)        ASSESSMENT/PLAN:      Rk is a 34 yo with signs and symptoms of early sleep apnea.   Also, with borderline hypertension and increasing weight  1. Discussed the interrelationships of weight, sleep and BP  2. Discussed sending for sleep study now.   3. Elected to work on weight loss. I believe that he would see a benefit in BP and snoring with even 10 lbs of weight loss.   4. Ultimate goal may be to be around 200 lbs, but will reassess over time  5. For diet: Add more proteins earlier in the day. Decrease potatoes at dinner (look into healthier substitutes with lower carbohydrates). Avoid chips if possible  6. For exercise, ask your sister about personal trainers or cross-fit possibilities. Aim for 4-5 times/week and 45-60 mins/session. Also, daily walks in the morning and midday. Obtain a standing desk. I described how to use desk intermittently. I think that it could help his general health   7. Weigh yourself and take BP daily. Record numbers on a phone rina  8. Take Magnesium 325-400 mg at bedtime. This can help lower BP and also help with sleep.  9. Look into garlic and other dietary strategies for blood pressure  10. Discussed strategies for healthy sleep at home.   11. Return in 6-8 weeks for annual exam and check of above. If not making progress with send for sleep study.  Also, if obtaining labs will include a thyroid function test.    --José Luis Pisano MD      SUBJECTIVE:   Here as wife reports that Rk's developing a more serious snoring condition. Rk had a cold last week.  He is feeling otherwise well.  He is fully recovered from the upper respiratory infection.  He's received covid-vaccine.     Denies any chest pain or shortness of breath.  In regards to his snoring and sleep, Rk says that he gets to bed at a reasonable time.  From my memory it is at approximately  "10:30 PM.  He awakes at approximately 530 to 6 AM.  He has 2-3 awakenings in the night.  Once is to go to the bathroom.  Once is to help with his child.  He does not feel overly rested when he wakes up in the morning.    In terms of his blood pressure and weight.  Here are our most recent numbers.  He has not been checking his blood pressure at home.    BP Readings from Last 6 Encounters:   07/21/21 (!) 152/95   01/04/21 (!) 142/87   04/02/19 139/81   11/27/18 (!) 148/95   05/31/18 148/83   06/07/17 138/87       Wt Readings from Last 5 Encounters:   07/21/21 99.5 kg (219 lb 4 oz)   01/04/21 99.3 kg (219 lb)   04/02/19 94.3 kg (208 lb)   11/27/18 95.4 kg (210 lb 4 oz)   05/31/18 94.3 kg (208 lb)       Typical breakfast: Oatmeal with fruit   No snacks  Lunch: Apple and carrots with humus. Occasional nuts  No snacks.   Dinner: Meat, veggie and potatoes.   Occasional late night chips.     Alcohol about 2 nights/week    Exercise is \"not much\". Tried at home, but didn't keep up.       Review Of Systems:    Has otherwise been in usual state of health.  Normal bowel and bladder.  No chest pain or shortness of breath.    Problem list per EMR:  Patient Active Problem List   Diagnosis     Lichen simplex chronicus     Elevated blood pressure reading without diagnosis of hypertension       No current outpatient medications on file.       No Known Allergies     Social:   Social History     Social History Narrative    Moved back to [place] from [facility] in 2016.  Wife is a nurse. She has suffered with chronic Lyme disease.      Now living in [place].    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     Had a daughter in the year 2019.          OBJECTIVE    Vitals: BP (!) 152/95   Pulse 88   Temp 97.6  F (36.4  C) (Skin)   Resp 15   Ht 1.854 m (6' 1\")   Wt 99.5 kg (219 lb 4 oz)   SpO2 97%   BMI 28.93 kg/m    BMI= Body mass index is 28.93 kg/m .  Blood pressure recorded on 3 occasions and elevated each " time.  See above for the most recent check.    He appears as a physically fit and healthy 35-year-old in no distress.  His oropharynx is clear.  Neck is supple without lymphadenopathy.    Cardiovascular and lung exams are normal.    SEE TOP OF NOTE FOR ASSESSMENT AND PLAN  30 minutes spent on the date of the encounter doing chart review, history and exam, documentation and further activities as noted in the note.     --José Luis Pisano MD  [facility], Department of Family Medicine and Community Health

## 2021-08-30 ENCOUNTER — OFFICE VISIT (OUTPATIENT)
Dept: FAMILY MEDICINE | Facility: CLINIC | Age: 36
End: 2021-08-30
Payer: COMMERCIAL

## 2021-08-30 VITALS
HEIGHT: 73 IN | DIASTOLIC BLOOD PRESSURE: 84 MMHG | HEART RATE: 104 BPM | RESPIRATION RATE: 15 BRPM | SYSTOLIC BLOOD PRESSURE: 148 MMHG | BODY MASS INDEX: 28.03 KG/M2 | WEIGHT: 211.5 LBS | TEMPERATURE: 96.8 F | OXYGEN SATURATION: 97 %

## 2021-08-30 DIAGNOSIS — R03.0 ELEVATED BLOOD PRESSURE READING WITHOUT DIAGNOSIS OF HYPERTENSION: ICD-10-CM

## 2021-08-30 DIAGNOSIS — Z00.00 ANNUAL PHYSICAL EXAM: ICD-10-CM

## 2021-08-30 DIAGNOSIS — Z13.220 SCREENING FOR LIPID DISORDERS: Primary | ICD-10-CM

## 2021-08-30 DIAGNOSIS — R73.09 ELEVATED GLUCOSE: ICD-10-CM

## 2021-08-30 LAB
ALBUMIN SERPL-MCNC: 4.3 G/DL (ref 3.4–5)
ALP SERPL-CCNC: 77 U/L (ref 40–150)
ALT SERPL W P-5'-P-CCNC: 49 U/L
ANION GAP SERPL CALCULATED.3IONS-SCNC: 6 MMOL/L (ref 3–14)
AST SERPL W P-5'-P-CCNC: 44 U/L (ref 0–45)
BILIRUB SERPL-MCNC: 0.9 MG/DL (ref 0.2–1.3)
BUN SERPL-MCNC: 12 MG/DL (ref 7–30)
CALCIUM SERPL-MCNC: 9.8 MG/DL (ref 8.5–10.1)
CHLORIDE BLD-SCNC: 103 MMOL/L (ref 94–109)
CHOLEST SERPL-MCNC: 172 MG/DL
CO2 SERPL-SCNC: 31 MMOL/L (ref 20–32)
CREAT SERPL-MCNC: 0.9 MG/DL (ref 0.66–1.25)
FASTING STATUS PATIENT QL REPORTED: NORMAL
GFR SERPL CREATININE-BSD FRML MDRD: >90 ML/MIN/1.73M2
GLUCOSE BLD-MCNC: 158 MG/DL (ref 70–99)
HBA1C MFR BLD: 5.5 % (ref 0–5.6)
HDLC SERPL-MCNC: 47 MG/DL
LDLC SERPL CALC-MCNC: 100 MG/DL
NONHDLC SERPL-MCNC: 125 MG/DL
POTASSIUM BLD-SCNC: 4.3 MMOL/L (ref 3.4–5.3)
PROT SERPL-MCNC: 7.7 G/DL (ref 6.8–8.8)
SODIUM SERPL-SCNC: 140 MMOL/L (ref 133–144)
TRIGL SERPL-MCNC: 127 MG/DL

## 2021-08-30 PROCEDURE — 80061 LIPID PANEL: CPT | Performed by: FAMILY MEDICINE

## 2021-08-30 ASSESSMENT — MIFFLIN-ST. JEOR: SCORE: 1932.49

## 2021-08-30 NOTE — NURSING NOTE
"36 year old  Chief Complaint   Patient presents with     Physical     no other concerns        Blood pressure (!) 154/85, pulse 107, temperature 96.8  F (36  C), temperature source Skin, resp. rate 15, height 1.845 m (6' 0.64\"), weight 95.9 kg (211 lb 8 oz), SpO2 97 %. Body mass index is 28.18 kg/m .  Patient Active Problem List   Diagnosis     Lichen simplex chronicus     Elevated blood pressure reading without diagnosis of hypertension       Wt Readings from Last 2 Encounters:   08/30/21 95.9 kg (211 lb 8 oz)   07/21/21 99.5 kg (219 lb 4 oz)     BP Readings from Last 3 Encounters:   08/30/21 (!) 154/85   07/21/21 (!) 152/95   01/04/21 (!) 142/87         No current outpatient medications on file.     No current facility-administered medications for this visit.       Social History     Tobacco Use     Smoking status: Never Smoker     Smokeless tobacco: Never Used   Substance Use Topics     Alcohol use: Yes     Comment: 2x per week     Drug use: No       Health Maintenance Due   Topic Date Due     ADVANCE CARE PLANNING  Never done     HIV SCREENING  Never done     HEPATITIS C SCREENING  Never done     PREVENTIVE CARE VISIT  06/07/2018     INFLUENZA VACCINE (1) 09/01/2021       No results found for: PAP      August 30, 2021 1:04 PM    "

## 2021-08-30 NOTE — PROGRESS NOTES
"Rk Quesada presents to [facility] today to have an annual exam.    IMPRESSION  Rk is a healthy 36 male. Main issues are reducing cardiovascular risk    ASSESSMENT/PLAN:    1. Borderline hypertension with BMI of 28 and question of sleep apnea that has been helped with weight loss  -Encouraged continued focus on diet and exercise with a goal of lowering weight to below 200 lbs    2. For the knee pains. Unclear etiology. Generally normal exam except for tight hamstrings  - Taught \"[place] Knee exercise\"for patellofemoral knee pain. Aim to do daily  - Work on hamstring flexibility.   - If symptoms persist, may refer to Physical therapy.     3. Borderline Tachycardia. Of note, pulse has been in the 90s before.   - Asked to decrease caffeine intake.   -Avoid stimulants.   - Let us know if exercise is limited.     4. Check Lipids and Comp panel       José Luis Pisano MD, MS    Brief Introduction:     First visit with me was 4 years ago on June 7, 2017 in need of a refill of Omeprazole. He has since stopped that medicine.   He notices that triggers of GERD are overeating and hoppy beer before bed.     Then, in 2021 seen for RIGHT elbow. Went to hand therapy.     In July 2021 seen with ? Sleep apnea, borderline hypertension and increasing weight.     He increased his exercise and also has made some small dietary changes and he's lost about 10 lbs and his snoring is much improved. He bought magnesium but hasn't started yet.     Wt Readings from Last 5 Encounters:   08/30/21 95.9 kg (211 lb 8 oz)   07/21/21 99.5 kg (219 lb 4 oz)   01/04/21 99.3 kg (219 lb)   04/02/19 94.3 kg (208 lb)   11/27/18 95.4 kg (210 lb 4 oz)       Only concern is some aching in the knees with his increased running.     Social  Social History     Social History Narrative    Moved back to [place] from [facility] in 2016.  Wife is a nurse. She has suffered with chronic Lyme disease.      Now living in [place].    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     Had a daughter in the year 2019.      Lifestyle habits and Preventive health issues:   Physical activity - Running 5-6 days/week, about 2 miles/time    Diet - Healthy. Could improve on decreasing portion size.     Alcohol intake involves about 2-3 nights/week and 1 drinks/night.    He does not use tobacco products.   His sleep is improving with less snoring.       Wears seat belt.--Yes.  Wears bike helmet--yes.      No concerns re: STIs.     Dental history- had a check up a few weeks ago.     ROS  PHQ-2 Score:     PHQ-2 ( 1999 Pfizer) 8/30/2021 7/21/2021   Q1: Little interest or pleasure in doing things 0 0   Q2: Feeling down, depressed or hopeless 0 0   PHQ-2 Score 0 0       CONSTITUTIONAL:NEGATIVE for fever, chills, change in weight  INTEGUMENTARY/SKIN: NEGATIVE for worrisome rashes, moles or lesions  EYES: NEGATIVE for vision changes or irritation  ENT/MOUTH: NEGATIVE for ear, mouth and throat problems  RESP:NEGATIVE for significant cough or SOB  CV: NEGATIVE for chest pain, palpitations, ORTIZ, orthopnea, PND  or peripheral edema  GI: NEGATIVE for nausea, abdominal pain, heartburn, or change in bowel habits  :NEGATIVE for frequency, dysuria, or hematuria  MUSCULOSKELETAL:NEGATIVE for significant arthralgias or myalgia  NEURO: NEGATIVE for weakness, dizziness or paresthesias  ENDOCRINE: NEGATIVE for polyuria/dipsia,  temperature intolerance, skin/hair changes  HEME/ALLERGY/IMMUNE: NEGATIVE for bleeding problems  PSYCHIATRIC: NEGATIVE for changes in mood or affect        No current outpatient medications on file.     No Known Allergies    Health Maintenance   Topic Date Due     ADVANCE CARE PLANNING  Never done     HIV SCREENING  Never done     HEPATITIS C SCREENING  Never done     PREVENTIVE CARE VISIT  06/07/2018     INFLUENZA VACCINE (1) 09/01/2021     LIPID  06/07/2022     DTAP/TDAP/TD IMMUNIZATION (5 - Td or Tdap) 08/01/2026     PHQ-2  Completed     COVID-19 Vaccine  " Completed     Pneumococcal Vaccine: Pediatrics (0 to 5 Years) and At-Risk Patients (6 to 64 Years)  Aged Out     IPV IMMUNIZATION  Aged Out     MENINGITIS IMMUNIZATION  Aged Out     HEPATITIS B IMMUNIZATION  Aged Out         Patient Active Problem List   Diagnosis     Lichen simplex chronicus     Elevated blood pressure reading without diagnosis of hypertension       Past Surgical History:   Procedure Laterality Date     HC TOOTH EXTRACTION W/FORCEP  2001     SURGICAL HISTORY OF -  Right        Family History   Problem Relation Age of Onset     Hypertension Mother      Myocardial Infarction Mother      Prostate Cancer Father      Diabetes Type 1 Brother      Hypertension Maternal Grandmother          Immunizations are as follows:      Immunization History   Administered Date(s) Administered     COVID-19,PF,Moderna 01/12/2021, 02/12/2021     DTAP (<7y) 1984, 1984, 02/14/1985     Influenza (IIV3) PF 08/01/2016, 09/01/2017     Influenza Quad, Recombinant, p-free (RIV4) 11/27/2018     Poliovirus, inactivated (IPV) 1984, 1984     TDAP Vaccine (Boostrix) 08/01/2016           EXAM  BP (!) 154/85 (BP Location: Left arm, Patient Position: Sitting, Cuff Size: Adult Regular)   Pulse 107   Temp 96.8  F (36  C) (Skin)   Resp 15   Ht 1.845 m (6' 0.64\")   Wt 95.9 kg (211 lb 8 oz)   SpO2 97%   BMI 28.18 kg/m      Repeat vitals at:   BP (!) 148/84   Pulse 104   Temp 96.8  F (36  C) (Skin)   Resp 15   Ht 1.845 m (6' 0.64\")   Wt 95.9 kg (211 lb 8 oz)   SpO2 97%   BMI 28.18 kg/m        GENERAL APPEARANCE: Alert, pleasant, NAD. Physically fit.   HEENT: neck is supple. No adenopathy, thyroid normal to palpation  RESP: Lungs clear to auscultation bilaterally.  Axillae: no palpable axillary masses or adenopathy  CV: Borderline tachycardic with regular rhythm, normal S1 S2, no murmur, no carotid bruits  ABDOMEN: soft, nontender, without HSM or masses. Bowel sounds normal  : deferred  Rectal exam: " deferred  MS: Extremities normal- no gross deformities noted, no tender, hot or swollen joints.    SKIN: no suspicious lesions or rashes  NEURO: Normal strength and tone, sensory exam grossly normal  PSYCH: mentation appears normal. and affect normal/bright.  EXT: no peripheral edema        SEE TOP OF NOTE FOR ASSESSMENT AND PLAN      José Luis Pisano MD, MS  [facility] Department of [facility] Medicine and [facility]

## 2021-08-30 NOTE — PATIENT INSTRUCTIONS
"IMPRESSION  Rk is a healthy 36 male. Main issues are reducing cardiovascular risk    ASSESSMENT/PLAN:    1. Borderline hypertension with BMI of 28 and question of sleep apnea that has been helped with weight loss  -Encouraged continued focus on diet and exercise with a goal of lowering weight to below 200 lbs    2. For the knee pains. Unclear etiology. Generally normal exam except for tight hamstrings  - Taught \"[place] Knee exercise\"for patellofemoral knee pain. Aim to do daily  - Work on hamstring flexibility.   - If symptoms persist, may refer to Physical therapy.     3. Check Lipids and Comp panel       José Luis Pisano MD, MS  "

## 2021-10-09 ENCOUNTER — HEALTH MAINTENANCE LETTER (OUTPATIENT)
Age: 36
End: 2021-10-09

## 2022-09-11 ENCOUNTER — HEALTH MAINTENANCE LETTER (OUTPATIENT)
Age: 37
End: 2022-09-11

## 2022-11-07 ENCOUNTER — OFFICE VISIT (OUTPATIENT)
Dept: FAMILY MEDICINE | Facility: CLINIC | Age: 37
End: 2022-11-07
Payer: COMMERCIAL

## 2022-11-07 VITALS
RESPIRATION RATE: 15 BRPM | HEIGHT: 73 IN | SYSTOLIC BLOOD PRESSURE: 144 MMHG | WEIGHT: 217 LBS | OXYGEN SATURATION: 98 % | BODY MASS INDEX: 28.76 KG/M2 | HEART RATE: 84 BPM | DIASTOLIC BLOOD PRESSURE: 83 MMHG | TEMPERATURE: 97.9 F

## 2022-11-07 DIAGNOSIS — R06.83 SNORING: ICD-10-CM

## 2022-11-07 DIAGNOSIS — Z13.6 SCREENING FOR CARDIOVASCULAR CONDITION: ICD-10-CM

## 2022-11-07 DIAGNOSIS — Z00.00 ANNUAL PHYSICAL EXAM: Primary | ICD-10-CM

## 2022-11-07 DIAGNOSIS — R73.09 ELEVATED GLUCOSE: ICD-10-CM

## 2022-11-07 PROCEDURE — 80061 LIPID PANEL: CPT | Performed by: FAMILY MEDICINE

## 2022-11-07 PROCEDURE — 80048 BASIC METABOLIC PNL TOTAL CA: CPT | Performed by: FAMILY MEDICINE

## 2022-11-07 ASSESSMENT — ENCOUNTER SYMPTOMS
DIZZINESS: 0
NERVOUS/ANXIOUS: 0
ABDOMINAL PAIN: 0
HEMATURIA: 0
HEADACHES: 0
HEARTBURN: 1
FEVER: 0
SHORTNESS OF BREATH: 0
CONSTIPATION: 0
COUGH: 0
HEMATOCHEZIA: 0
DYSURIA: 0
EYE PAIN: 0
DIARRHEA: 0
FREQUENCY: 0
PARESTHESIAS: 0
WEAKNESS: 0
CHILLS: 0
NAUSEA: 0
JOINT SWELLING: 0
PALPITATIONS: 0
MYALGIAS: 0
ARTHRALGIAS: 1
SORE THROAT: 0

## 2022-11-07 NOTE — PATIENT INSTRUCTIONS
"ASSESSMENT/PLAN:    Annual Exam/Preventive Issues   -Will check Lipids and BMP. Of note, random glucose was elevated a year ago, but A1C was normal.   - Discussed flu vaccine but Rk declined  - Discussed strategies to increase activity, e.g. Standing desk Technique taught.   Also, recommended setting aside time daily or twice daily for exercise, ideally with some outdoor time.      -Specific concerns:     1. Snoring - Sent for sleep study referral . Also, discussed weight loss.     2. Elevated blood pressure  - Obtain home BP cuff. Check numbers 1-2 times/day. Record numbers on a phone Fernando.   - Bring in cuff and phone to follow up in about 2 months.   Also, look into the \"DASH\" diet.   And, discussed weight loss effect on BP.       -Follow up: 2-3 months    José Luis Pisnao MD,   "

## 2022-11-07 NOTE — PROGRESS NOTES
"Rk Quesada presents to [facility] today to have an annual exam.    IMPRESSION  37-year-old with elevated blood pressure and a concern of excessive snoring.    ASSESSMENT/PLAN:    Annual Exam/Preventive Issues   -Will check Lipids and BMP. Of note, random glucose was elevated a year ago, but A1C was normal.   - Discussed flu vaccine but Rk declined  - Discussed strategies to increase activity, e.g. Standing desk Technique taught.   Also, recommended setting aside time daily or twice daily for exercise, ideally with some outdoor time.      -Specific concerns:     1. Snoring - Sent for sleep study referral . Also, discussed weight loss.     2. Elevated blood pressure  - Obtain home BP cuff. Check numbers 1-2 times/day. Record numbers on a phone Fernando.   - Bring in cuff and phone to follow up in about 2 months.   Also, look into the \"DASH\" diet.   And, discussed weight loss effect on BP.       -Follow up: 2-3 months    José Luis Pisano MD, MS    Introduction: Rk is here for an annual exam.    Reviewed his last annual visit from Aug 2021.   Rk said that his wife has noticed the snoring has become worse.   Of note, his weight had decreased but then he got a new job and works from home so activity has gone done.   He notices that his snoring fluctuates with his weight.    Wt Readings from Last 5 Encounters:   11/07/22 98.4 kg (217 lb)   08/30/21 95.9 kg (211 lb 8 oz)   07/21/21 99.5 kg (219 lb 4 oz)   01/04/21 99.3 kg (219 lb)   04/02/19 94.3 kg (208 lb)       Current Medications include:   No current outpatient medications on file.     No Known Allergies      Social  Social History     Social History Narrative    Moved back to [place] from [facility] in 2016.  Wife is a nurse.     She suffered with chronic Lyme disease, but that has improved.     Now living in [facility].    He works as a  at [employer].     Had a daughter in the year 2019 and a 2nd daughter b in 2021.       Lifestyle habits " and Preventive health issues:     Physical activity Was a runner but now not doing too much exercise.   Diet healthy but some trouble with portion control    Alcohol intake involves about 2-3 nights/week and 1 drinks/night.    He does not use tobacco products.   His sleep is interrupted with snoring     Wears seat belt.--Yes Wears bike helmet--Yes.      MALE ROS  Monogamous with wife. No concerns of STDs.   They are thinking about having a 3rd child.       Dental history- No problems. Has regular care.   Colorectal cancer screening - No family history of early CRC.     ROS  PHQ-2 Score:     PHQ-2 ( 1999 Pfizer) 11/7/2022 8/30/2021   Q1: Little interest or pleasure in doing things 0 0   Q2: Feeling down, depressed or hopeless 0 0   PHQ-2 Score 0 0   PHQ-2 Total Score (12-17 Years)- Positive if 3 or more points; Administer PHQ-A if positive - 0   Q1: Little interest or pleasure in doing things Not at all -   Q2: Feeling down, depressed or hopeless Not at all -   PHQ-2 Score 0 -         Health Maintenance   Topic Date Due     ADVANCE CARE PLANNING  Never done     HEPATITIS B IMMUNIZATION (1 of 3 - 3-dose series) Never done     HIV SCREENING  Never done     HEPATITIS C SCREENING  Never done     COVID-19 Vaccine (4 - Booster for Moderna series) 02/27/2022     YEARLY PREVENTIVE VISIT  08/30/2022     INFLUENZA VACCINE (1) 09/01/2022     DTAP/TDAP/TD IMMUNIZATION (5 - Td or Tdap) 08/01/2026     LIPID  08/30/2026     PHQ-2 (once per calendar year)  Completed     Pneumococcal Vaccine: Pediatrics (0 to 5 Years) and At-Risk Patients (6 to 64 Years)  Aged Out     IPV IMMUNIZATION  Aged Out     MENINGITIS IMMUNIZATION  Aged Out         Patient Active Problem List   Diagnosis     Lichen simplex chronicus     Elevated blood pressure reading without diagnosis of hypertension       Past Surgical History:   Procedure Laterality Date     HC TOOTH EXTRACTION W/FORCEP  2001     SURGICAL HISTORY OF -  Right        Family History   Problem  "Relation Age of Onset     Hypertension Mother      Myocardial Infarction Mother      Prostate Cancer Father      Diabetes Type 1 Brother      Hypertension Maternal Grandmother          Immunizations are as follows:      Immunization History   Administered Date(s) Administered     COVID-19,PF,Moderna 01/12/2021, 02/12/2021, 01/02/2022     DTAP (<7y) 1984, 1984, 02/14/1985     Influenza (IIV3) PF 08/01/2016, 09/01/2017     Influenza Quad, Recombinant, pf(RIV4) (Flublok) 11/27/2018     Poliovirus, inactivated (IPV) 1984, 1984     TDAP Vaccine (Boostrix) 08/01/2016         EXAM  BP (!) 150/83 (BP Location: Right arm, Patient Position: Sitting, Cuff Size: Adult Large)   Pulse 84   Temp 97.9  F (36.6  C) (Skin)   Resp 15   Ht 1.86 m (6' 1.23\")   Wt 98.4 kg (217 lb)   SpO2 98%   BMI 28.45 kg/m        GENERAL APPEARANCE: Appears well  HEENT: Neck is supple. No adenopathy, thyroid normal to palpation  RESP: Lungs clear to auscultation bilaterally.  Axillae: no palpable axillary masses or adenopathy  CV: regular rate and rhythm, normal S1 S2, no murmur, no carotid bruits  ABDOMEN: soft, nontender, without HSM or masses. Bowel sounds normal  : Deferred. No concerns.   Rectal exam: deferred  MS: Extremities normal- no gross deformities noted, no tender, hot or swollen joints.    SKIN: no suspicious lesions or rashes  NEURO: Normal strength and tone, sensory exam grossly normal  PSYCH: mentation appears normal. and affect normal/bright.  EXT: no peripheral edema    SEE TOP OF NOTE FOR ASSESSMENT AND PLAN      José Luis Pisano MD, MS  [facility] Department of Family Medicine and [facility]  Answers for HPI/ROS submitted by the patient on 11/7/2022  Frequency of exercise:: 1 day/week  Getting at least 3 servings of Calcium per day:: Yes  Diet:: Regular (no restrictions)  Taking medications regularly:: Yes  Medication side effects:: Not applicable  Bi-annual eye exam:: Yes  Dental " care twice a year:: Yes  Sleep apnea or symptoms of sleep apnea:: Excessive snoring  abdominal pain: No  Blood in stool: No  Blood in urine: No  chest pain: No  chills: No  congestion: No  constipation: No  cough: No  diarrhea: No  dizziness: No  ear pain: No  eye pain: No  nervous/anxious: No  fever: No  frequency: No  genital sores: No  headaches: No  hearing loss: No  heartburn: Yes  arthralgias: Yes  joint swelling: No  peripheral edema: No  mood changes: No  myalgias: No  nausea: No  dysuria: No  palpitations: No  Skin sensation changes: No  sore throat: No  urgency: No  rash: No  shortness of breath: No  visual disturbance: No  weakness: No  impotence: No  penile discharge: No  Additional concerns today:: No  Duration of exercise:: 15-30 minutes

## 2022-11-07 NOTE — NURSING NOTE
"37 year old  Chief Complaint   Patient presents with     Physical     Also talk about snoring       Blood pressure (!) 150/83, pulse 84, temperature 97.9  F (36.6  C), temperature source Skin, resp. rate 15, height 1.86 m (6' 1.23\"), weight 98.4 kg (217 lb), SpO2 98 %. Body mass index is 28.45 kg/m .  Patient Active Problem List   Diagnosis     Lichen simplex chronicus     Elevated blood pressure reading without diagnosis of hypertension       Wt Readings from Last 2 Encounters:   11/07/22 98.4 kg (217 lb)   08/30/21 95.9 kg (211 lb 8 oz)     BP Readings from Last 3 Encounters:   11/07/22 (!) 150/83   08/30/21 (!) 148/84   07/21/21 (!) 152/95         No current outpatient medications on file.     No current facility-administered medications for this visit.       Social History     Tobacco Use     Smoking status: Never     Smokeless tobacco: Never   Substance Use Topics     Alcohol use: Yes     Comment: 3x per week     Drug use: No       Health Maintenance Due   Topic Date Due     ADVANCE CARE PLANNING  Never done     HEPATITIS B IMMUNIZATION (1 of 3 - 3-dose series) Never done     HIV SCREENING  Never done     HEPATITIS C SCREENING  Never done     COVID-19 Vaccine (4 - Booster for Moderna series) 02/27/2022     YEARLY PREVENTIVE VISIT  08/30/2022     INFLUENZA VACCINE (1) 09/01/2022       No results found for: PAP      November 7, 2022 3:43 PM    "

## 2022-11-08 LAB
ANION GAP SERPL CALCULATED.3IONS-SCNC: 15 MMOL/L (ref 7–15)
BUN SERPL-MCNC: 15.8 MG/DL (ref 6–20)
CALCIUM SERPL-MCNC: 9.1 MG/DL (ref 8.6–10)
CHLORIDE SERPL-SCNC: 101 MMOL/L (ref 98–107)
CHOLEST SERPL-MCNC: 178 MG/DL
CREAT SERPL-MCNC: 1.04 MG/DL (ref 0.67–1.17)
DEPRECATED HCO3 PLAS-SCNC: 24 MMOL/L (ref 22–29)
GFR SERPL CREATININE-BSD FRML MDRD: >90 ML/MIN/1.73M2
GLUCOSE SERPL-MCNC: 112 MG/DL (ref 70–99)
HDLC SERPL-MCNC: 43 MG/DL
LDLC SERPL CALC-MCNC: 100 MG/DL
NONHDLC SERPL-MCNC: 135 MG/DL
POTASSIUM SERPL-SCNC: 4.2 MMOL/L (ref 3.4–5.3)
SODIUM SERPL-SCNC: 140 MMOL/L (ref 136–145)
TRIGL SERPL-MCNC: 177 MG/DL

## 2022-11-29 ENCOUNTER — MYC MEDICAL ADVICE (OUTPATIENT)
Dept: FAMILY MEDICINE | Facility: CLINIC | Age: 37
End: 2022-11-29

## 2022-11-29 DIAGNOSIS — L29.89 PRURITIC ERYTHEMATOUS RASH: Primary | ICD-10-CM

## 2022-11-30 NOTE — TELEPHONE ENCOUNTER
Referral placed to dermatology for evaluation of intermittent forearm rash. Advised pt to use thin layer of OTC hydrocortisone on affected skin once a day until it clears up and then PRN, until otherwise counseled by dermatology.    Tenzin MASTERSON, RN  11/30/22 11:07 AM

## 2023-03-02 ASSESSMENT — SLEEP AND FATIGUE QUESTIONNAIRES
HOW LIKELY ARE YOU TO NOD OFF OR FALL ASLEEP WHILE LYING DOWN TO REST IN THE AFTERNOON WHEN CIRCUMSTANCES PERMIT: HIGH CHANCE OF DOZING
HOW LIKELY ARE YOU TO NOD OFF OR FALL ASLEEP WHILE SITTING AND READING: WOULD NEVER DOZE
HOW LIKELY ARE YOU TO NOD OFF OR FALL ASLEEP WHILE SITTING AND TALKING TO SOMEONE: WOULD NEVER DOZE
HOW LIKELY ARE YOU TO NOD OFF OR FALL ASLEEP IN A CAR, WHILE STOPPED FOR A FEW MINUTES IN TRAFFIC: WOULD NEVER DOZE
HOW LIKELY ARE YOU TO NOD OFF OR FALL ASLEEP WHILE SITTING QUIETLY AFTER LUNCH WITHOUT ALCOHOL: WOULD NEVER DOZE
HOW LIKELY ARE YOU TO NOD OFF OR FALL ASLEEP WHEN YOU ARE A PASSENGER IN A CAR FOR AN HOUR WITHOUT A BREAK: WOULD NEVER DOZE
HOW LIKELY ARE YOU TO NOD OFF OR FALL ASLEEP WHILE WATCHING TV: WOULD NEVER DOZE
HOW LIKELY ARE YOU TO NOD OFF OR FALL ASLEEP WHILE SITTING INACTIVE IN A PUBLIC PLACE: WOULD NEVER DOZE

## 2023-03-03 ENCOUNTER — VIRTUAL VISIT (OUTPATIENT)
Dept: SLEEP MEDICINE | Facility: CLINIC | Age: 38
End: 2023-03-03
Attending: FAMILY MEDICINE
Payer: COMMERCIAL

## 2023-03-03 ENCOUNTER — MYC MEDICAL ADVICE (OUTPATIENT)
Dept: FAMILY MEDICINE | Facility: CLINIC | Age: 38
End: 2023-03-03

## 2023-03-03 VITALS — WEIGHT: 215 LBS | HEIGHT: 74 IN | BODY MASS INDEX: 27.59 KG/M2

## 2023-03-03 DIAGNOSIS — E66.3 OVERWEIGHT (BMI 25.0-29.9): ICD-10-CM

## 2023-03-03 DIAGNOSIS — R06.81 WITNESSED APNEIC SPELLS: ICD-10-CM

## 2023-03-03 DIAGNOSIS — G47.9 SLEEP DISTURBANCE: Primary | ICD-10-CM

## 2023-03-03 DIAGNOSIS — R06.83 SNORING: ICD-10-CM

## 2023-03-03 DIAGNOSIS — Z31.41 FERTILITY TESTING: Primary | ICD-10-CM

## 2023-03-03 DIAGNOSIS — R03.0 ELEVATED BLOOD PRESSURE READING WITHOUT DIAGNOSIS OF HYPERTENSION: ICD-10-CM

## 2023-03-03 PROCEDURE — 99203 OFFICE O/P NEW LOW 30 MIN: CPT | Mod: VID | Performed by: NURSE PRACTITIONER

## 2023-03-03 ASSESSMENT — PAIN SCALES - GENERAL: PAINLEVEL: NO PAIN (0)

## 2023-03-03 NOTE — PROGRESS NOTES
Video-Visit Details    Type of service:  Video Visit    Video Start Time (time video started): 8:31 AM    Video End Time (time video stopped): 8:57 AM    Originating Location (pt. Location): Home        Distant Location (provider location):  Off-site    Mode of Communication:  Video Conference via [facility]        Outpatient Sleep Medicine Consultation:      Name: Rk Quesada MRN# [id number]   Age: 37 year old YOB: 1985     Date of Consultation: March 3, 2023  Consultation is requested by: José Luis Pisano MD  [address], [facility] [place] [address] José Luis Pisano  Primary care provider: José Luis Pisano       Reason for Sleep Consult:     Rk Quesada is sent by José Luis Pisano for a sleep consultation regarding snoring, possible JUSTIN.    Patient s Reason for visit  Rk Khannamit main reason for visit: Snoring  Patient states problem(s) started: 5 years  Rk Quesada's goals for this visit: Assess sleep apnea           Assessment and Plan:     Summary Sleep Diagnoses:  1. Sleep disturbance  2. Snoring  3. Witnessed apneic spells  4. Elevated blood pressure reading without diagnosis of hypertension  5. Overweight (BMI 25.0-29.9)  - Sleep Study Referral  - HST-Home Sleep Apnea Test - Noxturnal Returnable; Future      Comorbid Diagnoses:  1.  Elevated BP without diagnosis of HTN  2.  Overweight      Summary Recommendations:  1.  Recommend further evaluation with home sleep apnea testing (TST) for possible sleep disordered breathing.  His STOP-BANG score is 5 which suggests a high risk of JUSTIN.  His symptoms are consistent with probable obstructive sleep apnea.  2.  Follow-up in approximately 2 weeks after the sleep study to review the results and determine next steps.    Orders Placed This Encounter   Procedures     HST-Home Sleep Apnea Test - Noxturnal Returnable         Summary Counseling:    Sleep Testing Reviewed  Obstructive Sleep Apnea Reviewed  Complications of  Untreated Sleep Apnea Reviewed  Previous recent chart notes and lab results reviewed  All potential therapeutic options including positive airway pressure, mandibular advancing oral appliances, and surgical options were discussed. Also counseled about impact of weight loss on JUSTIN.       Medical Decision-making:   Educational materials provided in instructions    Total time spent reviewing medical records, history and physical examination, review of previous testing and interpretation as well as documentation on this date: 40 minutes    CC: José Luis Pisano          History of Present Illness:     Rk Quesada is a 37-year-old male with a PMH pertinent for elevated blood pressure without diagnosis of HTN and otherwise healthy who presents today with symptoms of snoring, witnessed apneas, daytime somnolence, mild weight gain, and difficulty staying asleep for the last 5 years.  He was referred by his primary care provider for further evaluation of possible sleep disordered breathing.    Past Sleep Evaluations: No    SLEEP-WAKE SCHEDULE:     Work/School Days: Patient goes to school/work: Yes   Usually gets into bed at 11:00  Takes patient about 10 minutes to fall asleep  Has trouble falling asleep 0 nights per week  Wakes up in the middle of the night 2-3 times.  Wakes up due to Snorting self awake;External stimuli (bed partner, pets, noise, etc);Use the bathroom  He has trouble falling back asleep 1 times a week.   It usually takes 5 minutes to get back to sleep  Patient is usually up at 6:00  Uses alarm: No    Weekends/Non-work Days/All Other Days:  Usually gets into bed at 11:00   Takes patient about 10 minutes to fall asleep  Patient is usually up at 6:00  Uses alarm: No    Sleep Need  Patient gets  6-7 hours sleep on average   Patient thinks he needs about 7-8 hours sleep    Rk Quesada prefers to sleep in this position(s): Side   Patient states they do the following activities in bed: Watch TV;Use phone,  computer, or tablet    Naps  Patient takes a purposeful nap 1 times a week and naps are usually 1 hour in duration  He feels better after a nap: No  He dozes off unintentionally Never days per week  Patient has had a driving accident or near-miss due to sleepiness/drowsiness: No      SLEEP DISRUPTIONS:    Breathing/Snoring  Patient snores:Yes - loud  Other people complain about his snoring: Yes  Patient has been told he stops breathing in his sleep:Yes  He has issues with the following: Morning mouth dryness;Stuffy nose when you wake up;Heartburn or reflux at night;Getting up to urinate more than once    Movement:  Patient gets pain, discomfort, with an urge to move:  No  It happens when he is resting:  No  It happens more at night:  No  Patient has been told he kicks his legs at night:  Yes     Behaviors in Sleep:  Rk GUSMAN Dipak has experienced the following behaviors while sleeping:    He has experienced sudden muscle weakness during the day: No      Is there anything else you would like your sleep provider to know:        CAFFEINE AND OTHER SUBSTANCES:    Patient consumes caffeinated beverages per day:  3  Last caffeine use is usually: 11:00am  List of any prescribed or over the counter stimulants that patient takes:    List of any prescribed or over the counter sleep medication patient takes:    List of previous sleep medications that patient has tried:    Patient drinks alcohol to help them sleep: No  Patient drinks alcohol near bedtime: Yes     Alcohol use: 1 drink 2-3 nights per week  Nicotine/tobacco use: None  Recreational drug use: None    Family History:  Patient has a family member been diagnosed with a sleep disorder: Yes  Dad, sleep apnea - CPAP         SCALES:    EPWORTH SLEEPINESS SCALE      Gentry Sleepiness Scale ( FREDDY Crisostomo  1990-1997[facility] [place]/English - Final version - 21 Nov 07 - [facility] [employer].) 3/2/2023   Sitting and reading Would never doze   Watching TV Would never doze   Sitting,  inactive in a public place (e.g. a theatre or a meeting) Would never doze   As a passenger in a car for an hour without a break Would never doze   Lying down to rest in the afternoon when circumstances permit High chance of dozing   Sitting and talking to someone Would never doze   Sitting quietly after a lunch without alcohol Would never doze   In a car, while stopped for a few minutes in traffic Would never doze   Altus Score (MC) 3   Altus Score (Sleep) 3         INSOMNIA SEVERITY INDEX (ANDRÉS)      Insomnia Severity Index (ANDRÉS) 3/2/2023   Difficulty falling asleep 0   Difficulty staying asleep 1   Problems waking up too early 1   How SATISFIED/DISSATISFIED are you with your CURRENT sleep pattern? 3   How NOTICEABLE to others do you think your sleep problem is in terms of impairing the quality of your life? 1   How WORRIED/DISTRESSED are you about your current sleep problem? 1   To what extent do you consider your sleep problem to INTERFERE with your daily functioning (e.g. daytime fatigue, mood, ability to function at work/daily chores, concentration, memory, mood, etc.) CURRENTLY? 1   ANDRÉS Total Score 8       Guidelines for Scoring/Interpretation:  Total score categories:  0-7 = No clinically significant insomnia   8-14 = Subthreshold insomnia   15-21 = Clinical insomnia (moderate severity)  22-28 = Clinical insomnia (severe)  Used via courtesy of www.[employer] [facility].va.gov with permission from Chao Hart PhD., [facility]      STOP BANG score: 4    STOP BANG Questionnaire (  2008, the American Society of Anesthesiologists, Inc. [employer] 3/3/2023   1. Snoring - Do you snore loudly (louder than talking or loud enough to be heard through closed doors)? -   2. Tired - Do you often feel tired, fatigued, or sleepy during daytime? -   3. Observed - Has anyone observed you stop breathing during your sleep? -   4. Blood pressure - Do you have or are you being treated for high blood  "pressure? -   5. BMI - BMI more than 35 kg/m2? -   6. Age - Age over 50 yr old? -   7. Neck circumference - Neck circumference greater than 40 cm? -   8. Gender - Gender male? -   STOP BANG Score (MC): -   B/P Clinic: -   BMI Clinic: 27.6         GAD7    No flowsheet data found.      CAGE-AID    No flowsheet data found.    CAGE-AID reprinted with permission from the [place] Medical Journal, NIECY Storm. and LOLITA Allen, \"Conjoint screening questionnaires for alcohol and drug abuse\" [place] Medical Journal 94: 135-140, 1995.      PATIENT HEALTH QUESTIONNAIRE-9 (PHQ - 9)    No flowsheet data found.    Developed by Maria Esther Dong, Denise Adames, Reggie Abel and colleagues, with an educational vel from [employer] No permission required to reproduce, translate, display or distribute.        Allergies:    No Known Allergies    Medications:    No current outpatient medications on file.       Problem List:  Patient Active Problem List    Diagnosis Date Noted     Elevated blood pressure reading without diagnosis of hypertension 11/27/2018     Priority: Medium     Lichen simplex chronicus 05/11/2017     Priority: Medium        Past Medical/Surgical History:  Past Medical History:   Diagnosis Date     Hand injury 2013    Tore Hand ligarment     Past Surgical History:   Procedure Laterality Date     HC TOOTH EXTRACTION W/FORCEP  2001     SURGICAL HISTORY OF -  Right        Social History:  Social History     Socioeconomic History     Marital status:      Spouse name: Not on file     Number of children: Not on file     Years of education: Not on file     Highest education level: Not on file   Occupational History     Not on file   Tobacco Use     Smoking status: Never     Smokeless tobacco: Never   Substance and Sexual Activity     Alcohol use: Yes     Comment: 3x per week     Drug use: No     Sexual activity: Yes     Partners: Female   Other Topics Concern     Parent/sibling w/ CABG, MI or angioplasty " "before 65F 55M? Not Asked   Social History Narrative    Moved back to [place] from [facility] in 2016.  Wife is a nurse.     She suffered with chronic Lyme disease, but that has improved.     Now living in [facility].    He works as a  at [employer].     Had a daughter in the year 2019 and a 2nd daughter b in 2021.       Social Determinants of Health     Financial Resource Strain: Not on file   Food Insecurity: Not on file   Transportation Needs: Not on file   Physical Activity: Not on file   Stress: Not on file   Social Connections: Not on file   Intimate Partner Violence: Not on file   Housing Stability: Not on file       Family History:  Family History   Problem Relation Age of Onset     Hypertension Mother      Myocardial Infarction Mother      Prostate Cancer Father      Diabetes Type 1 Brother      Hypertension Maternal Grandmother        Review of Systems:  A complete review of systems reviewed by me is negative with the exeption of what has been mentioned in the history of present illness.  In the last TWO WEEKS have you experienced any of the following symptoms?  Fevers: No  Night Sweats: Yes  Weight Gain: No  Pain at Night: No  Double Vision: No  Changes in Vision: No  Difficulty Breathing through Nose: No  Sore Throat in Morning: Yes  Dry Mouth in the Morning: Yes  Shortness of Breath Lying Flat: No  Shortness of Breath With Activity: No  Awakening with Shortness of Breath: No  Increased Cough: No  Heart Racing at Night: No  Swelling in Feet or Legs: No  Diarrhea at Night: No  Heartburn at Night: Yes  Urinating More than Once at Night: Yes  Losing Control of Urine at Night: No  Joint Pains at Night: No  Headaches in Morning: No  Weakness in Arms or Legs: No  Depressed Mood: No  Anxiety: Yes     Physical Examination:  Vitals: Ht 1.88 m (6' 2\")   Wt 97.5 kg (215 lb)   BMI 27.60 kg/m    BMI= Body mass index is 27.6 kg/m .           GENERAL APPEARANCE: healthy, alert, no distress and " cooperative  EYES: Eyes grossly normal to inspection  RESP: Unlabored, easy breathing with normal conversational speech  CV: color normal  NEURO: Alert and oriented x3, mentation intact and speech normal  PSYCH: mentation appears normal and affect normal/bright  Mallampati Class: Unable to examine  Tonsillar Stage: Unable to examine         Data: All pertinent previous laboratory data reviewed     Recent Labs   Lab Test 11/07/22  1620 08/30/21  1347    140   POTASSIUM 4.2 4.3   CHLORIDE 101 103   CO2 24 31   ANIONGAP 15 6   * 158*   BUN 15.8 12   CR 1.04 0.90   DAVID 9.1 9.8       Recent Labs   Lab Test 06/07/17  1400   WBC 5.3   RBC 5.50   HGB 16.0   HCT 48.2   MCV 88   MCH 29.1   MCHC 33.2   RDW 13.0          Recent Labs   Lab Test 08/30/21  1347   PROTTOTAL 7.7   ALBUMIN 4.3   BILITOTAL 0.9   ALKPHOS 77   AST 44   ALT 49       No results found for: TSH    No results found for: UAMP, UBARB, BENZODIAZEUR, UCANN, UCOC, OPIT, UPCP    No results found for: IRONSAT, ME06594, INESSA    No results found for: PH, PHARTERIAL, PO2, OR4UFLNUJIU, SAT, PCO2, HCO3, BASEEXCESS, VIK, BEB    @LABRCNTIPR(phv:4,pco2v:4,po2v:4,hco3v:4,kirsty:4,o2per:4)@    Echocardiology: No results found for this or any previous visit (from the past 4320 hour(s)).    Chest x-ray: No results found for this or any previous visit from the past 365 days.      Chest CT: No results found for this or any previous visit from the past 365 days.      PFT: Most Recent [facility] Pulmonary Function Testing    No results found for: 20001  No results found for: 20002  No results found for: 20003  No results found for: 20015  No results found for: 20016  No results found for: 20027  No results found for: 20028  No results found for: 20029  No results found for: 20079  No results found for: 20080  No results found for: 20081  No results found for: 20335  No results found for: 20105  No results found for: 20053  No results found for: 20054  No results found  for: 90813      LEW Garner CNP 3/3/2023   Sleep Medicine      This note was written with the assistance of the Dragon voice-dictation technology software. The final document, although reviewed, may contain errors. For corrections, please contact the office.

## 2023-03-03 NOTE — NURSING NOTE
Is the patient currently in the state of [place]? YES    Visit mode:VIDEO    If the visit is dropped, the patient can be reconnected by: VIDEO VISIT: Text to cell phone: [phone]    Will anyone else be joining the visit? NO      How would you like to obtain your AVS? MyChart    Are changes needed to the allergy or medication list? NO    Reason for visit:     New appt    Ivet Doyle, VF

## 2023-03-03 NOTE — PATIENT INSTRUCTIONS
"      MY TREATMENT INFORMATION FOR SLEEP APNEA-  Rk Quesada    DOCTOR : LEW Garner CNP    Am I having a sleep study at a sleep center?  --->Due to normal delays, you will be contacted within 2-4 weeks to schedule    Am I having a home sleep study?  --->Watch the video for the device you are using:    -/drop off device-   https://www.Say-Hey.com/watch?v=yGGFBdELGhk    -Disposable device sent out require phone/computer application-   https://www.Say-Hey.com/watch?v=BCce_vbiwxE      Frequently asked questions:  1. What is Obstructive Sleep Apnea (JUSTIN)? JUSTIN is the most common type of sleep apnea. Apnea means, \"without breath.\"  Apnea is most often caused by narrowing or collapse of the upper airway as muscles relax during sleep.   Almost everyone has occasional apneas. Most people with sleep apnea have had brief interruptions at night frequently for many years.  The severity of sleep apnea is related to how frequent and severe the events are.   2. What are the consequences of JUSTIN? Symptoms include: feeling sleepy during the day, snoring loudly, gasping or stopping of breathing, trouble sleeping, and occasionally morning headaches or heartburn at night.  Sleepiness can be serious and even increase the risk of falling asleep while driving. Other health consequences may include development of high blood pressure and other cardiovascular disease in persons who are susceptible. Untreated JUSTIN  can contribute to heart disease, stroke and diabetes.   3. What are the treatment options? In most situations, sleep apnea is a lifelong disease that must be managed with daily therapy. Medications are not effective for sleep apnea and surgery is generally not considered until other therapies have been tried. Your treatment is your choice . Continuous Positive Airway (CPAP) works right away and is the therapy that is effective in nearly everyone. An oral device to hold your jaw forward is usually the next most " reliable option. Other options include postioning devices (to keep you off your back), weight loss, and surgery including a tongue pacing device. There is more detail about some of these options below.  4. Are my sleep studies covered by insurance? Although we will request verification of coverage, we advise you also check in advance of the study to ensure there is coverage.    Important tips for those choosing CPAP and similar devices   Know your equipment:  CPAP is continuous positive airway pressure that prevents obstructive sleep apnea by keeping the throat from collapsing while you are sleeping. In most cases, the device is  smart  and can slowly self-adjusts if your throat collapses and keeps a record every day of how well you are treated-this information is available to you and your care team.  BPAP is bilevel positive airway pressure that keeps your throat open and also assists each breath with a pressure boost to maintain adequate breathing.  Special kinds of BPAP are used in patients who have inadequate breathing from lung or heart disease. In most cases, the device is  smart  and can slowly self-adjusts to assist breathing. Like CPAP, the device keeps a record of how well you are treated.  Your mask is your connection to the device. You get to choose what feels most comfortable and the staff will help to make sure if fits. Here: are some examples of the different masks that are available:       Key points to remember on your journey with sleep apnea:  Sleep study.  PAP devices often need to be adjusted during a sleep study to show that they are effective and adjusted right.  Good tips to remember: Try wearing just the mask during a quiet time during the day so your body adapts to wearing it. A humidifier is recommended for comfort in most cases to prevent drying of your nose and throat. Allergy medication from your provider may help you if you are having nasal congestion.  Getting settled-in. It takes  more than one night for most of us to get used to wearing a mask. Try wearing just the mask during a quiet time during the day so your body adapts to wearing it. A humidifier is recommended for comfort in most cases. Our team will work with you carefully on the first day and will be in contact within 4 days and again at 2 and 4 weeks for advice and remote device adjustments. Your therapy is evaluated by the device each day.   Use it every night. The more you are able to sleep naturally for 7-8 hours, the more likely you will have good sleep and to prevent health risks or symptoms from sleep apnea. Even if you use it 4 hours it helps. Occasionally all of us are unable to use a medical therapy, in sleep apnea, it is not dangerous to miss one night.   Communicate. Call our skilled team on the number provided on the first day if your visit for problems that make it difficult to wear the device. Over 2 out of 3 patients can learn to wear the device long-term with help from our team. Remember to call our team or your sleep providers if you are unable to wear the device as we may have other solutions for those who cannot adapt to mask CPAP therapy. It is recommended that you sleep your sleep provider within the first 3 months and yearly after that if you are not having problems.   Use it for your health. We encourage use of CPAP masks during daytime quiet periods to allow your face and brain to adapt to the sensation of CPAP so that it will be a more natural sensation to awaken to at night or during naps. This can be very useful during the first few weeks or months of adapting to CPAP though it does not help medically to wear CPAP during wakefulness and  should not be used as a strategy just to meet guidelines.  Take care of your equipment. Make sure you clean your mask and tubing using directions every day and that your filter and mask are replaced as recommended or if they are not working.     BESIDES CPAP, WHAT OTHER  THERAPIES ARE THERE?    Positioning Device  Positioning devices are generally used when sleep apnea is mild and only occurs on your back.This example shows a pillow that straps around the waist. It may be appropriate for those whose sleep study shows milder sleep apnea that occurs primarily when lying flat on one's back. Preliminary studies have shown benefit but effectiveness at home may need to be verified by a home sleep test. These devices are generally not covered by medical insurance.  Examples of devices that maintain sleeping on the back to prevent snoring and mild sleep apnea.    Belt type body positioner  http://[employer]/    Electronic reminder  http://nightshifttherapy.com/            Oral Appliance  What is oral appliance therapy?  An oral appliance device fits on your teeth at night like a retainer used after having braces. The device is made by a specialized dentist and requires several visits over 1-2 months before a manufactured device is made to fit your teeth and is adjusted to prevent your sleep apnea. Once an oral device is working properly, snoring should be improved. A home sleep test may be recommended at that time if to determine whether the sleep apnea is adequately treated.       Some things to remember:  -Oral devices are often, but not always, covered by your medical insurance. Be sure to check with your insurance provider.   -If you are referred for oral therapy, you will be given a list of specialized dentists to consider or you may choose to visit the Web site of the American Academy of Dental Sleep Medicine  -Oral devices are less likely to work if you have severe sleep apnea or are extremely overweight.     More detailed information  An oral appliance is a small acrylic device that fits over the upper and lower teeth  (similar to a retainer or a mouth guard). This device slightly moves jaw forward, which moves the base of the tongue forward, opens the airway, improves breathing  for effective treat snoring and obstructive sleep apnea in perhaps 7 out of 10 people .  The best working devices are custom-made by a dental device  after a mold is made of the teeth 1, 2, 3.  When is an oral appliance indicated?  Oral appliance therapy is recommended as a first-line treatment for patients with primary snoring, mild sleep apnea, and for patients with moderate sleep apnea who prefer appliance therapy to use of CPAP4, 5. Severity of sleep apnea is determined by sleep testing and is based on the number of respiratory events per hour of sleep.   How successful is oral appliance therapy?  The success rate of oral appliance therapy in patients with mild sleep apnea is 75-80% while in patients with moderate sleep apnea it is 50-70%. The chance of success in patients with severe sleep apnea is 40-50%. The research also shows that oral appliances have a beneficial effect on the cardiovascular health of JUSTIN patients at the same magnitude as CPAP therapy7.  Oral appliances should be a second-line treatment in cases of severe sleep apnea, but if not completely successful then a combination therapy utilizing CPAP plus oral appliance therapy may be effective. Oral appliances tend to be effective in a broad range of patients although studies show that the patients who have the highest success are females, younger patients, those with milder disease, and less severe obesity. 3, 6.   Finding a dentist that practices dental sleep medicine  Specific training is available through the American Academy of Dental Sleep Medicine for dentists interested in working in the field of sleep. To find a dentist who is educated in the field of sleep and the use of oral appliances, near you, visit the Web site of the American Academy of Dental Sleep Medicine.    References  1. Jose Carlos et al. Objectively measured vs self-reported compliance during oral appliance therapy for sleep-disordered breathing. Chest 2013;  144(5): 8816-4752.  2. Lloyd et al. Objective measurement of compliance during oral appliance therapy for sleep-disordered breathing. Thorax 2013; 68(1): 91-96.  3. Mary Beth et al. Mandibular advancement devices in 620 men and women with JUSTIN and snoring: tolerability and predictors of treatment success. Chest 2004; 125: 5168-4262.  4. Javier, et al. Oral appliances for snoring and JUSTIN: a review. Sleep 2006; 29: 244-262.  5. Kirti et al. Oral appliance treatment for JUSTIN: an update. J Clin Sleep Med 2014; 10(2): 215-227.  6. Fernando et al. Predictors of OSAH treatment outcome. J Dent Res 2007; 86: 1037-6700.      Weight Loss:    Weight loss is a long-term strategy that may improve sleep apnea in some patients.    Weight management is a personal decision and the decision should be based on your interest and the potential benefits.  If you are interested in exploring weight loss strategies, the following discussion covers the impact on weight loss on sleep apnea and the approaches that may be successful.    Being overweight does not necessarily mean you will have health consequences.  Those who have BMI over 35 or over 27 with existing medical conditions carries greater risk.   Weight loss decreases severity of sleep apnea in most people with obesity. For those with mild obesity who have developed snoring with weight gain, even 15-30 pound weight loss can improve and occasionally eliminate sleep apnea.  Structured and life-long dietary and health habits are necessary to lose weight and keep healthier weight levels.     Though there may be significant health benefits from weight loss, long-term weight loss is very difficult to achieve- studies show success with dietary management in less than 10% of people. In addition, substantial weight loss may require years of dietary control and may be difficult if patients have severe obesity. In these cases, surgical management may be considered.  Finally, older  individuals who have tolerated obesity without health complications may be less likely to benefit from weight loss strategies.      Your BMI is Body mass index is 27.6 kg/m .    What is BMI?  Body mass index (BMI) is one way to tell whether you are at a healthy weight, overweight, or obese. It measures your weight in relation to your height.  A BMI of 18.5 to 24.9 is in the healthy range. A person with a BMI of 25 to 29.9 is considered overweight, and someone with a BMI of 30 or greater is considered obese.  Another way to find out if you are at risk for health problems caused by overweight and obesity is to measure your waist. If you are a woman and your waist is more than 35 inches, or if you are a man and your waist is more than 40 inches, your risk of disease may be higher.  More than two-thirds of American adults are considered overweight or obese. Being overweight or obese increases the risk for further weight gain.  Excess weight may lead to heart disease and diabetes. Creating and following plans for healthy eating and physical activity may help you improve your health.    Methods for maintaining or losing weight.  Weight control is part of healthy lifestyle and includes exercise, emotional health, and healthy eating habits.  Careful eating habits lifelong is the mainstay of weight control.  Though there are significant health benefits from weight loss, long-term weight loss with diet alone may be very difficult to achieve- studies show long-term success with dietary management in less than 10% of people. Attaining a healthy weight may be especially difficult to achieve in those with severe obesity. In some cases, medications, devices and surgical management might be considered.    What can you do?  If you are overweight or obese and are interested in methods for weight loss, you should discuss this with your provider. In addition, we recommend that you review healthy life styles and methods for weight loss  available through the National Institutes of Health patient information sites:   http://win.niddk.nih.gov/publications/index.htm    Surgery:    Surgery for obstructive sleep apnea is considered generally only when other therapies fail to work. Surgery may be discussed with you if you are having a difficult time tolerating CPAP and or when there is an abnormal structure that requires surgical correction.  Nose and throat surgeries often enlarge the airway to prevent collapse.  Most of these surgeries create pain for 1-2 weeks and up to half of the most common surgeries are not effective throughout life.  You should carefully discuss the benefits and drawbacks to surgery with your sleep provider and surgeon to determine if it is the best solution for you.   More information  Surgery for JUSTIN is directed at areas that are responsible for narrowing or complete obstruction of the airway during sleep.  There are a wide range of procedures available to enlarge and/or stabilize the airway to prevent blockage of breathing in the three major areas where it can occur: the palate, tongue, and nasal regions.  Successful surgical treatment depends on the accurate identification of the factors responsible for obstructive sleep apnea in each person.  A personalized approach is required because there is no single treatment that works well for everyone.  Because of anatomic variation, consultation with an examination by a sleep surgeon is a critical first step in determining what surgical options are best for each patient.  In some cases, examination during sedation may be recommended in order to guide the selection of procedures.  Patients will be counseled about risks and benefits as well as the typical recovery course after surgery. Surgery is typically not a cure for a person s JUSTIN.  However, surgery will often significantly improve one s JUSTIN severity (termed  success rate ).  Even in the absence of a cure, surgery will decrease  the cardiovascular risk associated with OSA7; improve overall quality of life8 (sleepiness, functionality, sleep quality, etc).      Palate Procedures:  Patients with JUSTIN often have narrowing of their airway in the region of their tonsils and uvula.  The goals of palate procedures are to widen the airway in this region as well as to help the tissues resist collapse.  Modern palate procedure techniques focus on tissue conservation and soft tissue rearrangement, rather than tissue removal.  Often the uvula is preserved in this procedure. Residual sleep apnea is common in patient after pharyngoplasty with an average reduction in sleep apnea events of 33%2.      Tongue Procedures:  ExamWhile patients are awake, the muscles that surround the throat are active and keep this region open for breathing. These muscles relax during sleep, allowing the tongue and other structures to collapse and block breathing.  There are several different tongue procedures available.  Selection of a tongue base procedure depends on characteristics seen on physical exam.  Generally, procedures are aimed at removing bulky tissues in this area or preventing the back of the tongue from falling back during sleep.  Success rates for tongue surgery range from 50-62%3.    Hypoglossal Nerve Stimulation:  Hypoglossal nerve stimulation has recently received approval from the [place] Food and Drug Administration for the treatment of obstructive sleep apnea.  This is based on research showing that the system was safe and effective in treating sleep apnea6.  Results showed that the median AHI score decreased 68%, from 29.3 to 9.0. This therapy uses an implant system that senses breathing patterns and delivers mild stimulation to airway muscles, which keeps the airway open during sleep.  The system consists of three fully implanted components: a small generator (similar in size to a pacemaker), a breathing sensor, and a stimulation lead.  Using a  small handheld remote, a patient turns the therapy on before bed and off upon awakening.    Candidates for this device must be greater than 18 years of age, have moderate to severe JUSTIN (AHI between 15-65), BMI less than 35, have tried CPAP/oral appliance for at least 8 weeks without success, and have appropriate upper airway anatomy (determined by a sleep endoscopy performed by Dr. Dante Cooper).    Hypoglossal Nerve Stimulation Pathway:    The sleep surgeon s office will work with the patient through the insurance prior-authorization process (including communications and appeals).    Nasal Procedures:  Nasal obstruction can interfere with nasal breathing during the day and night.  Studies have shown that relief of nasal obstruction can improve the ability of some patients to tolerate positive airway pressure therapy for obstructive sleep apnea1.  Treatment options include medications such as nasal saline, topical corticosteroid and antihistamine sprays, and oral medications such as antihistamines or decongestants. Non-surgical treatments can include external nasal dilators for selected patients. If these are not successful by themselves, surgery can improve the nasal airway either alone or in combination with these other options.      Combination Procedures:  Combination of surgical procedures and other treatments may be recommended, particularly if patients have more than one area of narrowing or persistent positional disease.  The success rate of combination surgery ranges from 66-80%2,3.    References  Carolina ABBOTT. The Role of the Nose in Snoring and Obstructive Sleep Apnoea: An Update.  Eur Arch Otorhinolaryngol. 2011; 268: 1365-73.   Adam SM; Nato JA; Carin JR; Pallanch JF; Raul MB; Abdirizak SG; Carlo BHATTI. Surgical modifications of the upper airway for obstructive sleep apnea in adults: a systematic review and meta-analysis. SLEEP 2010;33(10):6199-0191. Trent WILLSON. Hypopharyngeal surgery in obstructive  sleep apnea: an evidence-based medicine review.  Arch Otolaryngol Head Neck Surg. 2006 Feb;132(2):206-13.  Familia YH1, Bel Y, Joaquin DACIA. The efficacy of anatomically based multilevel surgery for obstructive sleep apnea. Otolaryngol Head Neck Surg. 2003 Oct;129(4):327-35.  Trent WILLSON, Goldberg A. Hypopharyngeal Surgery in Obstructive Sleep Apnea: An Evidence-Based Medicine Review. Arch Otolaryngol Head Neck Surg. 2006 Feb;132(2):206-13.  Ilir WAN et al. Upper-Airway Stimulation for Obstructive Sleep Apnea.  N Engl J Med. 2014 Jan 9;370(2):139-49.  Chance Y et al. Increased Incidence of Cardiovascular Disease in Middle-aged Men with Obstructive Sleep Apnea. Am J Respir Crit Care Med; 2002 166: 159-165  Beardshannan TEJEDA et al. Studying Life Effects and Effectiveness of Palatopharyngoplasty (SLEEP) study: Subjective Outcomes of Isolated Uvulopalatopharyngoplasty. Otolaryngol Head Neck Surg. 2011; 144: 623-631.        WHAT IF I ONLY HAVE SNORING?    Mandibular advancement devices, lateral sleep positioning, long-term weight loss and treatment of nasal allergies have been shown to improve snoring.  Exercising tongue muscles with a game (https://[employer].Backspaces/us/rina/soundly-reduce-snoring/nm7511654385) or stimulating the tongue during the day with a device (https://doi.org/10.3390/xxx12567327) have improved snoring in some individuals.    Remember to Drive Safe... Drive Alive     Sleep health profoundly affects your health, mood, and your safety.  Thirty three percent of the population (one in three of us) is not getting enough sleep and many have a sleep disorder. Not getting enough sleep or having an untreated / undertreated sleep condition may make us sleepy without even knowing it. In fact, our driving could be dramatically impaired due to our sleep health. As your provider, here are some things I would like you to know about driving:     Here are some warning signs for impairment and dangerous drowsy driving:               -Having been awake more than 16 hours               -Looking tired               -Eyelid drooping              -Head nodding (it could be too late at this point)              -Driving for more than 30 minutes     Some things you could do to make the driving safer if you are experiencing some drowsiness:              -Stop driving and rest              -Call for transportation              -Make sure your sleep disorder is adequately treated     Some things that have been shown NOT to work when experiencing drowsiness while driving:              -Turning on the radio              -Opening windows              -Eating any  distracting  /  entertaining  foods (e.g., sunflower seeds, candy, or any other)              -Talking on the phone      Your decision may not only impact your life, but also the life of others. Please, remember to drive safe for yourself and all of us.

## 2023-03-09 ENCOUNTER — TELEPHONE (OUTPATIENT)
Dept: UROLOGY | Facility: CLINIC | Age: 38
End: 2023-03-09
Payer: COMMERCIAL

## 2023-03-09 NOTE — TELEPHONE ENCOUNTER
[facility] Call Center    Phone Message    May a detailed message be left on voicemail: yes     Reason for Call: Other: Patient being referred for New Infertility with Dr. Nichols - unable to pull up schedule for video visit.  Sending encounter message for review and follow-up with patient for scheduling. Thank you!    Action Taken: Message routed to:  [facility] ([facility] Urology    Travel Screening: Not Applicable

## 2023-03-09 NOTE — TELEPHONE ENCOUNTER
Referral to urology for fertility testing/consult. Previously scheduled for semen analysis in 2020 but was cancelled due to partner's pregnancy which was ultimately not carried to term. Pt advised that he will be contacted by  scheduling or to call number provided directly.    Tenzin MASTERSON, RN  03/09/23 10:06 AM

## 2023-03-21 NOTE — TELEPHONE ENCOUNTER
Action 2023 JTV 6:55 PM    Action Taken CSS sent a message to Nurse to place labs.     MEDICAL RECORDS REQUEST   [facility]  U[facility] Clinic  [address] [place] [address]  PHONE: [phone]  Fax: [phone]        FUTURE VISIT INFORMATION                                                   Rk Quesada, : 1985 scheduled for future visit at [facility]    APPOINTMENT INFORMATION:    Date: 2023    Provider:  Jas Nichols MD    Reason for Visit/Diagnosis: INFERTILITY    REFERRAL INFORMATION:    Referring provider:  José Luis Pisano MD in [facility] PRIMARY [facility]ARE    RECORDS REQUESTED FOR VISIT                                                     NOTES  STATUS/DETAILS   NOTE from referring provider  yes, 2023 -- José Luis Pisano MD in [facility] PRIMARY [facility]ARE   MEDICATION LIST  yes   INFERTILITY     ALBUMIN  in process   FSH  in process   LAST UROLOGY/OB GYN VISIT NOTE  in process   LH  in process   SEMEN ANALYSIS (LAST 2)  in process   SHBG  in process   T  in process     PRE-VISIT CHECKLIST      Record collection complete If no, please explain pending   Appointment appropriately scheduled           (right time/right provider) Yes   Joint diagnostic appointment coordinated correctly          (ensure right order & amount of time) Yes   MyChart activation Yes   Questionnaire complete If no, please explain pending

## 2023-03-22 DIAGNOSIS — Z31.41 ENCOUNTER FOR SEMEN ANALYSIS: Primary | ICD-10-CM

## 2023-04-28 ENCOUNTER — LAB (OUTPATIENT)
Dept: LAB | Facility: CLINIC | Age: 38
End: 2023-04-28
Payer: COMMERCIAL

## 2023-04-28 DIAGNOSIS — Z31.41 ENCOUNTER FOR SEMEN ANALYSIS: ICD-10-CM

## 2023-04-28 PROCEDURE — 89322 SEMEN ANAL STRICT CRITERIA: CPT

## 2023-05-01 LAB
ABNORMAL SPERM MORPHOLOGY: 98
ABSTINENCE DAYS: 3 DAYS (ref 2–7)
AGGLUTINATION: NO
ANALYSIS TEMP - CENTIGRADE: 21 CENTIGRADE
COLLECTION METHOD: ABNORMAL
COLLECTION SITE: ABNORMAL
CONSENT TO RELEASE TO PARTNER: YES
DAL- RECEIVED TIME: ABNORMAL
HEAD DEFECT: 96 %
IMMOTILE: 42 %
LIQUEFIED: YES
MIDPIECE DEFECT: 39 %
NON-PROGRESSIVE MOTILITY: 3 %
NORMAL SPERM MORPHOLOGY: 2 % NORMAL FORMS
PROGRESSIVE MOTILITY: 55 %
ROUND CELLS: <0.1 MILLION/ML
SPECIMEN PH: 7.2 PH
SPECIMEN VOLUME: 1.8 ML
SPERM CONCENTRATION: 145.5 MILLION/ML
TAIL DEFECT: 7 %
TIME OF ANALYSIS: ABNORMAL
TOTAL PROGRESSIVE MOTILE NUMBER: 144 MILLION
TOTAL SPERM NUMBER: 262 MILLION
VISCOUS: NO
VITALITY: ABNORMAL

## 2023-05-03 ENCOUNTER — OFFICE VISIT (OUTPATIENT)
Dept: FAMILY MEDICINE | Facility: CLINIC | Age: 38
End: 2023-05-03
Attending: FAMILY MEDICINE
Payer: COMMERCIAL

## 2023-05-03 DIAGNOSIS — L30.9 DERMATITIS: Primary | ICD-10-CM

## 2023-05-03 PROCEDURE — 99214 OFFICE O/P EST MOD 30 MIN: CPT | Performed by: NURSE PRACTITIONER

## 2023-05-03 RX ORDER — TRIAMCINOLONE ACETONIDE 1 MG/G
OINTMENT TOPICAL 2 TIMES DAILY
Qty: 30 G | Refills: 2 | Status: SHIPPED | OUTPATIENT
Start: 2023-05-03

## 2023-05-03 ASSESSMENT — PAIN SCALES - GENERAL: PAINLEVEL: NO PAIN (0)

## 2023-05-03 NOTE — LETTER
5/3/2023         RE: Rk Quesada  [address]  [place] [address]        Dear Colleague,    Thank you for referring your patient, Rk Quesada, to the [facility]. Please see a copy of my visit note below.    [facility] Dermatology Note  Encounter Date: May 3, 2023  Office Visit     Reviewed patients past medical history and pertinent chart review prior to patients visit today.     Dermatology Problem List:  Dermatitis, given triamcinolone 0.1% ointment and advised to try benadryl 25 mg with flares    ____________________________________________    Assessment & Plan:     # recurrent  dermatitis, arms and hands.  From the pictures it appears to be either a contact or irritant dermatitis versus an eczematous dermatitis.  Discussed that from photographs some not able to clearly diagnose.  I did give the patient a prescription for some triamcinolone 0.1% ointment to use twice daily as needed with flares and if the rash is resolving in 3 weeks or less okay to use on an as-needed basis.  If it is not resolving with this treatment I would like to see him back in clinic when flared.  He will send a message if he is flared and I will try to work him in when he is flared.  Also okay to try an oral antihistamine such as Benadryl 25 mg before bedtime or Zyrtec in the morning when he is flared.    Elizabeth Aragon, CNP  Dermatology    _______________________________________    CC: Derm Problem (Rash on both arms off/on over the past 7 years, itchy when rash present, no reash present today)    HPI:  Mr. Rk Quesada is a(n) 37 year old male who presents today as a new patient for recurring rash that has been on/off for about 6-7 years. It usually flares on the dorsum hands and goes up the arms. It has never been elsewhere. It is itchy and red and bumpy. He has been trying some hydrocortisone 1% but doesn't feel like it goes away with this. It eventually resolves on  its own in 1-2 weeks. Seems to coorelate with change of season.     Patient is otherwise feeling well, without additional skin concerns.      Physical Exam:  SKIN: Focused examination of hands, arms, face, neck was performed.  - no rash today    - No other lesions of concern on areas examined.     Medications:  No current outpatient medications on file.     No current facility-administered medications for this visit.      Past Medical History:   Patient Active Problem List   Diagnosis     Lichen simplex chronicus     Elevated blood pressure reading without diagnosis of hypertension     Past Medical History:   Diagnosis Date     Hand injury 2013    Tore Hand ligarment       CC José Luis Pisano MD  [address] [place] [address] on close of this encounter.       Again, thank you for allowing me to participate in the care of your patient.        Sincerely,        LEW Meyers CNP

## 2023-05-03 NOTE — PROGRESS NOTES
[facility] Dermatology Note  Encounter Date: May 3, 2023  Office Visit     Reviewed patients past medical history and pertinent chart review prior to patients visit today.     Dermatology Problem List:  Dermatitis, given triamcinolone 0.1% ointment and advised to try benadryl 25 mg with flares    ____________________________________________    Assessment & Plan:     # recurrent  dermatitis, arms and hands.  From the pictures it appears to be either a contact or irritant dermatitis versus an eczematous dermatitis.  Discussed that from photographs some not able to clearly diagnose.  I did give the patient a prescription for some triamcinolone 0.1% ointment to use twice daily as needed with flares and if the rash is resolving in 3 weeks or less okay to use on an as-needed basis.  If it is not resolving with this treatment I would like to see him back in clinic when flared.  He will send a message if he is flared and I will try to work him in when he is flared.  Also okay to try an oral antihistamine such as Benadryl 25 mg before bedtime or Zyrtec in the morning when he is flared.    Elizabeth Aragon, CNP  Dermatology    _______________________________________    CC: Derm Problem (Rash on both arms off/on over the past 7 years, itchy when rash present, no reash present today)    HPI:  Mr. Rk Quesada is a(n) 37 year old male who presents today as a new patient for recurring rash that has been on/off for about 6-7 years. It usually flares on the dorsum hands and goes up the arms. It has never been elsewhere. It is itchy and red and bumpy. He has been trying some hydrocortisone 1% but doesn't feel like it goes away with this. It eventually resolves on its own in 1-2 weeks. Seems to coorelate with change of season.     Patient is otherwise feeling well, without additional skin concerns.      Physical Exam:  SKIN: Focused examination of hands, arms, face, neck was performed.  - no rash today    - No other  lesions of concern on areas examined.     Medications:  No current outpatient medications on file.     No current facility-administered medications for this visit.      Past Medical History:   Patient Active Problem List   Diagnosis     Lichen simplex chronicus     Elevated blood pressure reading without diagnosis of hypertension     Past Medical History:   Diagnosis Date     Hand injury 2013    Tore Hand ligarment       CC José Luis Pisano MD  [address], [place] [address] on close of this encounter.

## 2023-05-03 NOTE — RESULT ENCOUNTER NOTE
Results released to Jovie.    Thank you for performing these tests prior to your appointment.      We will discuss these results in depth at your coming appointment.

## 2023-05-05 ENCOUNTER — PRE VISIT (OUTPATIENT)
Dept: UROLOGY | Facility: CLINIC | Age: 38
End: 2023-05-05
Payer: COMMERCIAL

## 2023-05-05 NOTE — TELEPHONE ENCOUNTER
Reason for Visit: Consult to discuss fertility    Orders/Procedures/Records:  in system     Contact Patient: n/a    Rooming Requirements: normal      Laura Singletary LPN  05/05/23  10:43 AM

## 2023-05-19 ENCOUNTER — APPOINTMENT (OUTPATIENT)
Dept: LAB | Facility: CLINIC | Age: 38
End: 2023-05-19
Payer: COMMERCIAL

## 2023-05-19 ENCOUNTER — PRE VISIT (OUTPATIENT)
Dept: UROLOGY | Facility: CLINIC | Age: 38
End: 2023-05-19

## 2023-05-19 ENCOUNTER — OFFICE VISIT (OUTPATIENT)
Dept: UROLOGY | Facility: CLINIC | Age: 38
End: 2023-05-19
Payer: COMMERCIAL

## 2023-05-19 VITALS
HEIGHT: 74 IN | HEART RATE: 81 BPM | DIASTOLIC BLOOD PRESSURE: 87 MMHG | WEIGHT: 215 LBS | BODY MASS INDEX: 27.59 KG/M2 | SYSTOLIC BLOOD PRESSURE: 139 MMHG

## 2023-05-19 DIAGNOSIS — Z31.41 FERTILITY TESTING: ICD-10-CM

## 2023-05-19 LAB
FSH SERPL IRP2-ACNC: 4.4 MIU/ML (ref 1.5–12.4)
LH SERPL-ACNC: 6.9 MIU/ML (ref 1.7–8.6)
PROLACTIN SERPL 3RD IS-MCNC: 11 NG/ML (ref 4–15)

## 2023-05-19 PROCEDURE — 84403 ASSAY OF TOTAL TESTOSTERONE: CPT | Performed by: UROLOGY

## 2023-05-19 PROCEDURE — 36415 COLL VENOUS BLD VENIPUNCTURE: CPT | Performed by: PATHOLOGY

## 2023-05-19 PROCEDURE — 84270 ASSAY OF SEX HORMONE GLOBUL: CPT | Performed by: UROLOGY

## 2023-05-19 PROCEDURE — 99203 OFFICE O/P NEW LOW 30 MIN: CPT | Performed by: UROLOGY

## 2023-05-19 PROCEDURE — 84146 ASSAY OF PROLACTIN: CPT | Performed by: UROLOGY

## 2023-05-19 PROCEDURE — 83001 ASSAY OF GONADOTROPIN (FSH): CPT | Performed by: UROLOGY

## 2023-05-19 PROCEDURE — 83002 ASSAY OF GONADOTROPIN (LH): CPT | Performed by: UROLOGY

## 2023-05-19 RX ORDER — COVID-19 MOLECULAR TEST ASSAY
KIT MISCELLANEOUS
COMMUNITY
Start: 2022-12-21

## 2023-05-19 ASSESSMENT — PAIN SCALES - GENERAL: PAINLEVEL: NO PAIN (0)

## 2023-05-19 NOTE — PROGRESS NOTES
Dear Dr. Pisano , it was my pleasure to see . Rk Quesada, a 37 year old male here in consultation today for fertility evaluation.  His spouse is Ivet age 38 (11/10/84).    I saw him 2018 before they had children.  I diagnosed him with left varicocele at that time.  On my initial exam I thought he had bilateral varicoceles, but ultrasound showed only left-sided varicocele.  They subsequently had 2 children naturally on their own.  He never did have a varicocele repair.  He did not do a semen analysis initially, as they became pregnant.  He does have a semen analysis from last month to review.    They have been trying 8-9 months now for a third child.    His spouse has been seeing her OB/GYN, no obvious female fertility factor identified, although she has advanced age.  History of pelvic pain which has improved for her, after having carried children       PAST MEDICAL HISTORY:    Past Medical History:   Diagnosis Date     Hand injury 2013    Tore Hand ligarment   Left varicocele longstanding    PAST SURG HISTORY  Past Surgical History:   Procedure Laterality Date     HC TOOTH EXTRACTION W/FORCEP  2001     SURGICAL HISTORY OF -  Right     hand surgery     Medications as of May 19, 2023   No prescription medications     ALLERGY:   No Known Allergies    SOCIAL HISTORY:   . Occupation: , chemotherapy development.  No alcohol abuse, no tobacco use.   Social History     Tobacco Use     Smoking status: Never     Smokeless tobacco: Never   Substance Use Topics     Alcohol use: Yes     Comment: 3x per week     Drug use: No       FAMILY HISTORY: No inherited disorders.   Family History   Problem Relation Age of Onset     Hypertension Mother      Myocardial Infarction Mother      Prostate Cancer Father      Diabetes Type 1 Brother      Hypertension Maternal Grandmother        REVIEW OF SYSTEMS:  Significant for feeling well at present.    Denies erectile dysfunction, ejaculatory problems, testicular  "pain. No vision or smell deficits, no chronic sinus or respiratory infections. No recent febrile illness, weight loss. No heat or cold intolerance, gynecomastia, or other endocrine complaints.    Otherwise, no constitutional, eye, ENT, heart, lung, GI , musculoskeletal, skin, neurologic, psychiatric, or hematologic complaints.    GONADOTOXIN EXPOSURE: Unremarkable. Otherwise negative for marijuana, heat, chemicals, pesticides, heavy metals, steroids, chemotherapy or radiation.    GENERAL PHYSICAL EXAM  /87 (BP Location: Right arm, Patient Position: Sitting, Cuff Size: Adult Regular)   Pulse 81   Ht 1.88 m (6' 2\")   Wt 97.5 kg (215 lb)   BMI 27.60 kg/m     Constitutional: No acute distress. Well nourished.   PSYCH: normal mood and affect.  NEURO: normal gait, no focal deficits.   EYES: anicteric, EOMI, PERR  CARDIOPULMONARY: breathing non-labored, pulse regular, no peripheral edema.  GI: Abdomen soft, non-tender, no  surgical scars, no organomegaly.  MUSCULOSKELETAL: normal limb proportions, no muscle wasting, no contractures.  SKIN: Normal virilized hair distribution, no lesions, warts or rashes over genitalia, abdomen extremities or face.  HEME/LYMPH: no ecchymosis, no lymphadenopathy in groin, no lymphedema.     EXAM:  Phallus circumcised- slightly hypospadic meatus  Left testis descended , size is 16-18 cc , consistency is normal . No intra-testicular masses.   Right testis descended , size is 18 cc , consistency is normal . No intra-testicular masses.   Epididymes present, non-tender, not enlarged.   Left cord: Vas present. Grade III  varicocele noted.  This is prominent bulge above the left testis, does decompress when supine.  Right cord: Vas present. No varicocele     Rectal exam deferred.     Labs reviewed today  Baseline labs from 2018:  Component      Latest Ref Rng 6/5/2018  11:05 AM   Testosterone Total      240 - 950 ng/dL 400    Sex Hormone Binding Globulin      11 - 80 nmol/L 29    Free " Testosterone Calculated      4.7 - 24.4 ng/dL 8.75    Estradiol Ultrasensitive      10 - 40 pg/mL 20    FSH      0.7 - 10.8 IU/L 3.8        Component      Latest Ref Rng 4/28/2023  7:22 AM   Collection Method Masturbation    Collection Site [facility]    Time of Receipt 7:26 AM    Time of Analysis 7:42 AM    Analysis Temp - Centigrade      centigrade 21.0    Abstinence days      2 - 7 days 3    Liquefied      Yes  Yes    Viscous      No  No    Agglutination      No  No    pH      >=7.2 pH 7.2    Volume      >=1.4 mL 1.8    Concentration      >=16.0 million/ml 145.5    Total Number      >=39.0 million 262.0    Progressive motility      >=30 % 55    Non-progressive motility      % 3    Immotile      % 42    Total Progressive Motile      million 144.00    Vitality --    Normal Sperm      >=4 % Normal forms 2 (L)    Abnormal Sperm 98    Head Defect      % 96    Midpiece Defect      % 39    Tail Defect      % 7    Round Cells      <=2.0 million/ml <0.1    Consent to Release to Partner Yes         Imaging reviewed:      Scrotal ultrasound June 5, 2018  Was negative for varicocele on the right.  Left varicocele was confirmed.  No other testis pathology noted.       ASSESSMENT:    Left grade III varicocele, isolated teratospermia       PLAN:    Hormonal panel today, eval for empirical therapy options.    Semen analysis reviewed Discussed teratospermia- given fairly ubiquitous finding among high-quality diagnostic fertility labs, this makes the clinical utility questionable.  Discussed risks, benefits, and alternatives of varix repair, including various methods.    Discussed risks, benefits, and alternatives of varicocele repair, including various methods.  I counseled him extensively on the nature of varicoceles, and their possible effects on pain, fertility, and testosterone production.  I described surgery and embolization approaches, and the detailed risks of surgical repair.  These include damage to artery (ischemia), damage  to lymphatics ( hydrocele), as well as risk of recurrence (~1%). Discussed that about 2/3rds of men see improved semen parameters after varicocele repair and that improvement takes at least 3 months to see.  Discussed that varicocele pain typically improves with repair, but in rare cases the testis can become sensitive after a surgical repair.     Discussed assisted reproductive technology options, IUI (intrauterine inseminations) or IVF.    Discussed advanced female age is a concern.        Discussed OTC supplements to consider taking- FertilAid for Men.    I will contact him with results and plan/options.    Thank-you for allowing me to care for your patient.  Sincerely,    MD José Luis Parmar       Additional Coding Information:    Problems:  3 -- one acute uncomplicated illness or injury    Data Reviewed  3 or more studies reviewed, as listed above     Tests ordered/pendin labs ordered     Notes from other providers reviewed: N/A     Independent interpretation of a test performed by another physician/other qualified health care professional (not separately reported) -reviewed semen analysis results and implications of isolated teratospermia.    Level of risk:  3 -- low risk (e.g., OTC medication or observation, minor surgery without risks)    Time spent:  28 minutes spent on the date of the encounter doing chart review, history and exam, documentation and further activities per the note

## 2023-05-19 NOTE — PATIENT INSTRUCTIONS
- Please complete your scheduled lab appointment for a blood draw. Dr. Nichols will contact you regarding the results.    It was a pleasure meeting with you today.  Thank you for allowing me and my team the privilege of caring for you today.  YOU are the reason we are here, and I truly hope we provided you with the excellent service you deserve.  Please let us know if there is anything else we can do for you so that we can be sure you are leaving completely satisfied with your care experience.

## 2023-05-19 NOTE — NURSING NOTE
"Chief Complaint   Patient presents with     Consult     Infertility       Blood pressure 139/87, pulse 81, height 1.88 m (6' 2\"), weight 97.5 kg (215 lb). Body mass index is 27.6 kg/m .    Patient Active Problem List   Diagnosis     Lichen simplex chronicus     Elevated blood pressure reading without diagnosis of hypertension       No Known Allergies    Current Outpatient Medications   Medication Sig Dispense Refill     triamcinolone (KENALOG) 0.1 % external ointment Apply topically 2 times daily When rash on hands/arms flares then stop when resolved 30 g 2     BINAXNOW COVID-19 AG HOME TEST KIT TEST AS DIRECTED TODAY         Social History     Tobacco Use     Smoking status: Never     Smokeless tobacco: Never   Substance Use Topics     Alcohol use: Yes     Comment: 3x per week     Drug use: No       Yrn Daniel, EMT  5/19/2023  8:07 AM  "

## 2023-05-22 LAB — SHBG SERPL-SCNC: 23 NMOL/L (ref 11–80)

## 2023-05-23 LAB
TESTOST FREE SERPL-MCNC: 6.67 NG/DL
TESTOST SERPL-MCNC: 281 NG/DL (ref 240–950)

## 2023-05-24 NOTE — RESULT ENCOUNTER NOTE
"Dear Rk     Here are your recent test results which are NORMAL.  There are no major concerns.    The main fixable factor for you would be the large left varicocele.  Let me know if you're interested in repair of this with microscopy surgery by me, or embolization with interventional radiology.  Often varicocele repair will improve sperm count and testosterone levels.    If you don't want surgery, the other option from the male side is to use a prescription medication like Clomid to fine tune your hormones some, and hopefully improve sperm production.  Clomid is marketed as a female fertility medication but is used commonly \"off-label\" for treating men as well.  Clomid helps the body increase stimulation to the testicle to try to improve sperm production.  Results are variable (sometimes doesn't work at all) and results take at least 3-4 months on the medication to see possible improvement in semen analysis parameters, due to the slow rate of sperm production.  Side effects are uncommon but can include decreased libido, breast tenderness, or water weight gain.  Let me know if you are interested in trying this.        Thank You,    Please let me know if you have any questions!    Yasmeen TOLEDO  "

## 2023-06-06 ASSESSMENT — SLEEP AND FATIGUE QUESTIONNAIRES
HOW LIKELY ARE YOU TO NOD OFF OR FALL ASLEEP WHEN YOU ARE A PASSENGER IN A CAR FOR AN HOUR WITHOUT A BREAK: SLIGHT CHANCE OF DOZING
HOW LIKELY ARE YOU TO NOD OFF OR FALL ASLEEP WHILE SITTING AND READING: WOULD NEVER DOZE
HOW LIKELY ARE YOU TO NOD OFF OR FALL ASLEEP WHILE WATCHING TV: SLIGHT CHANCE OF DOZING
HOW LIKELY ARE YOU TO NOD OFF OR FALL ASLEEP WHILE LYING DOWN TO REST IN THE AFTERNOON WHEN CIRCUMSTANCES PERMIT: HIGH CHANCE OF DOZING
HOW LIKELY ARE YOU TO NOD OFF OR FALL ASLEEP WHILE SITTING AND TALKING TO SOMEONE: WOULD NEVER DOZE
HOW LIKELY ARE YOU TO NOD OFF OR FALL ASLEEP WHILE SITTING INACTIVE IN A PUBLIC PLACE: WOULD NEVER DOZE
HOW LIKELY ARE YOU TO NOD OFF OR FALL ASLEEP IN A CAR, WHILE STOPPED FOR A FEW MINUTES IN TRAFFIC: WOULD NEVER DOZE
HOW LIKELY ARE YOU TO NOD OFF OR FALL ASLEEP WHILE SITTING QUIETLY AFTER LUNCH WITHOUT ALCOHOL: WOULD NEVER DOZE

## 2023-06-07 ENCOUNTER — OFFICE VISIT (OUTPATIENT)
Dept: SLEEP MEDICINE | Facility: CLINIC | Age: 38
End: 2023-06-07
Attending: NURSE PRACTITIONER
Payer: COMMERCIAL

## 2023-06-07 DIAGNOSIS — R06.81 WITNESSED APNEIC SPELLS: ICD-10-CM

## 2023-06-07 DIAGNOSIS — R03.0 ELEVATED BLOOD PRESSURE READING WITHOUT DIAGNOSIS OF HYPERTENSION: ICD-10-CM

## 2023-06-07 DIAGNOSIS — G47.9 SLEEP DISTURBANCE: ICD-10-CM

## 2023-06-07 DIAGNOSIS — E66.3 OVERWEIGHT (BMI 25.0-29.9): ICD-10-CM

## 2023-06-07 DIAGNOSIS — R06.83 SNORING: ICD-10-CM

## 2023-06-07 PROCEDURE — G0399 HOME SLEEP TEST/TYPE 3 PORTA: HCPCS | Performed by: INTERNAL MEDICINE

## 2023-06-07 NOTE — PROGRESS NOTES
Pt is completing a home sleep test. Pt was instructed on how to put on the Noxturnal T3 device and associated equipment before going to bed and given the opportunity to practice putting it on before leaving the sleep center. Pt was reminded to bring the home sleep test kit back to the center tomorrow, at agreed upon time for download and reporting.   Neck circumference: 41 CM / 16 inches.

## 2023-06-08 ENCOUNTER — DOCUMENTATION ONLY (OUTPATIENT)
Dept: SLEEP MEDICINE | Facility: CLINIC | Age: 38
End: 2023-06-08
Attending: NURSE PRACTITIONER
Payer: COMMERCIAL

## 2023-06-08 NOTE — PROGRESS NOTES
This HSAT was performed using a Noxturnal T3 device which recorded snore, sound, movement activity, body position, nasal pressure, oronasal thermal airflow, pulse, oximetry and both chest and abdominal respiratory effort. HSAT data was restricted to the time patient states they were in bed.     HSAT was scored using 1B 4% hypopnea rule.     HST AHI (Non-PAT): 2.7  Snoring was reported as intermittent.  Time with SpO2 below 89% was 0.1 minutes.   Overall signal quality was good     Pt will follow up with sleep provider to determine appropriate therapy.         HST POST-STUDY QUESTIONNAIRE    1. What time did you go to bed?  11:40pm  2. How long do you think it took to fall asleep?  5 mins  3. What time did you wake up to start the day?  5:45am  4. Did you get up during the night at all?  No  5. If you woke up, do you remember approximately what time(s)?   6. Did you have any difficulty with the equipment?  No  7. Did you us any type of treatment with this study?  None  8. Was the head of the bed elevated? No  9. Did you sleep in a recliner?  No  10. Did you stop using CPAP at least 3 days before this test?  NA  11. Any other information you'd like us to know?

## 2023-06-13 NOTE — PROCEDURES
"HOME SLEEP STUDY INTERPRETATION        Patient: Rk Quesada  MRN: [id number]  YOB: 1985  Study Date: 2023  PCP/Referring Provider: José Luis Pisano MD  Ordering Provider: LEW Garner CNP         Indications for Home Study: kR Quesada is a 37 year old male with a history of elevated blood pressure measurement without diagnosis of hypertension who presents with symptoms suggestive of obstructive sleep apnea.    Estimated body mass index is 27.6 kg/m  as calculated from the following:    Height as of 23: 1.88 m (6' 2\").    Weight as of 23: 97.5 kg (215 lb).  Total score - Booker: 5 (2023  7:15 PM)  STOP-BAN/8        Data: A full night home sleep study was performed recording the standard physiologic parameters including body position, movement, sound, nasal pressure, thermal oral airflow, chest and abdominal movements with respiratory inductance plethysmography, and oxygen saturation by pulse oximetry. Pulse rate was estimated by oximetry recording. This study was considered adequate based on > 4 hours of quality oximetry and respiratory recording. As specified by the AASM Manual for the Scoring of Sleep and Associated events, version 2.3, Rule VIII.D 1B, 4% oxygen desaturation scoring for hypopneas is used as a standard of care on all home sleep apnea testing.        Analysis Time:  372 minutes        Respiration:   Sleep Associated Hypoxemia: sustained hypoxemia was not present. Baseline oxygen saturation was 94%.  Time with saturation less than or equal to 88% was 0 minutes. The lowest oxygen saturation was 88%.   Snoring: Snoring was intermittent and mild (6% at average 63 dB.)  Respiratory events: The home study revealed a presence of 5 obstructive apneas and 1 mixed and central apneas. There were 11 hypopneas resulting in a combined apnea/hypopnea index [AHI] of 2.7 events per hour.  AHI was 8.9 per hour supine, NA per hour prone, 0.9 per hour on left " side, and 1.1 per hour on right side.   Pattern: Excluding events noted above, respiratory rate and pattern was Normal.      Position: Percent of time spent: supine - 22%, prone - 0%, on left - 17%, on right - 61%.      Heart Rate: By pulse oximetry normal rate was noted.       Assessment:     Overall no significant obstructive sleep apnea. (Mild sleep apnea noted exclusively supine.)    Sleep associated hypoxemia was not present.    Recommendations:    Consider positional therapy.    Suggest optimizing sleep hygiene and avoiding sleep deprivation.    Weight management.        Diagnosis Code(s): Snoring R06.83    Ronel Sanchez MD, June 13, 2023   Diplomate, American Board of Internal Medicine, Sleep Medicine

## 2023-06-23 ENCOUNTER — VIRTUAL VISIT (OUTPATIENT)
Dept: SLEEP MEDICINE | Facility: CLINIC | Age: 38
End: 2023-06-23
Payer: COMMERCIAL

## 2023-06-23 VITALS — WEIGHT: 215 LBS | HEIGHT: 74 IN | BODY MASS INDEX: 27.59 KG/M2

## 2023-06-23 DIAGNOSIS — R06.83 SNORING: Primary | ICD-10-CM

## 2023-06-23 PROCEDURE — 99213 OFFICE O/P EST LOW 20 MIN: CPT | Mod: VID | Performed by: NURSE PRACTITIONER

## 2023-06-23 ASSESSMENT — SLEEP AND FATIGUE QUESTIONNAIRES
HOW LIKELY ARE YOU TO NOD OFF OR FALL ASLEEP IN A CAR, WHILE STOPPED FOR A FEW MINUTES IN TRAFFIC: WOULD NEVER DOZE
HOW LIKELY ARE YOU TO NOD OFF OR FALL ASLEEP WHILE LYING DOWN TO REST IN THE AFTERNOON WHEN CIRCUMSTANCES PERMIT: HIGH CHANCE OF DOZING
HOW LIKELY ARE YOU TO NOD OFF OR FALL ASLEEP WHILE WATCHING TV: SLIGHT CHANCE OF DOZING
HOW LIKELY ARE YOU TO NOD OFF OR FALL ASLEEP WHILE SITTING QUIETLY AFTER LUNCH WITHOUT ALCOHOL: WOULD NEVER DOZE
HOW LIKELY ARE YOU TO NOD OFF OR FALL ASLEEP WHILE SITTING AND TALKING TO SOMEONE: WOULD NEVER DOZE
HOW LIKELY ARE YOU TO NOD OFF OR FALL ASLEEP WHILE SITTING INACTIVE IN A PUBLIC PLACE: WOULD NEVER DOZE
HOW LIKELY ARE YOU TO NOD OFF OR FALL ASLEEP WHEN YOU ARE A PASSENGER IN A CAR FOR AN HOUR WITHOUT A BREAK: SLIGHT CHANCE OF DOZING
HOW LIKELY ARE YOU TO NOD OFF OR FALL ASLEEP WHILE SITTING AND READING: WOULD NEVER DOZE

## 2023-06-23 ASSESSMENT — PAIN SCALES - GENERAL: PAINLEVEL: NO PAIN (0)

## 2023-06-23 NOTE — NURSING NOTE
Is the patient currently in the state of [place]? YES    Visit mode:VIDEO    If the visit is dropped, the patient can be reconnected by: VIDEO VISIT: Text to cell phone: [phone]    Will anyone else be joining the visit? NO    How would you like to obtain your AVS? MyChart    Are changes needed to the allergy or medication list? NO    Reason for visit: RECHECK (HST follow up)    Has patient had flu shot for current/most recent flu season? If so, when? No    Pt does not check BP at home    EVELINA Coley/GRAEME

## 2023-06-23 NOTE — PROGRESS NOTES
"Virtual Visit Details    Type of service:  Video Visit     Originating Location (pt. Location): Home    Distant Location (provider location):  Off-site  Platform used for Video Visit: Viigo       Home Sleep Apnea Testing Results Visit:    Chief Complaint   Patient presents with     RECHECK     HST follow up       Rk Quesada is a 37 year old male with a PMH pertinent for elevated blood pressure without diagnosis of HTN and otherwise healthy who returns to [facility] after having had Home Sleep Apnea Testing.  He presented with symptoms suggestive of obstructive sleep apnea.    Estimated body mass index is 27.6 kg/m  as calculated from the following:    Height as of this encounter: 1.88 m (6' 2\").    Weight as of this encounter: 97.5 kg (215 lb).    Total score - [place]: 5 (6/23/2023  9:07 AM)   Total Score: 5 (6/6/2023  7:16 PM)  ANDRÉS Total Score: 5    Home Sleep Apnea Testing - 6/07/2023: Weight: 215 lbs 0 oz     Analysis Time:  372 minutes       Respiration:   Sleep Associated Hypoxemia: sustained hypoxemia was not present. Baseline oxygen saturation was 94%.  Time with saturation less than or equal to 88% was 0 minutes. The lowest oxygen saturation was 88%.   Snoring: Snoring was intermittent and mild (6% at average 63 dB.)   Respiratory events: The home study revealed a presence of 5 obstructive apneas and 1 mixed and central apneas. There were 11 hypopneas resulting in a combined apnea/hypopnea index [AHI] of 2.7 events per hour.  AHI was 8.9 per hour supine, NA per hour prone, 0.9 per hour on left side, and 1.1 per hour on right side.   Pattern: Excluding events noted above, respiratory rate and pattern was Normal.        Position: Percent of time spent: supine - 22%, prone - 0%, on left - 17%, on right - 61%.        Heart Rate: By pulse oximetry normal rate was noted.         Assessment:     Overall no significant obstructive sleep apnea. (Mild sleep apnea noted exclusively " "supine.)    Sleep associated hypoxemia was not present.      Overall signal quality was Good.     Rk GUSMAN Dipak reports that he slept Good .     Home Sleep Apnea Testing was reviewed in detail today with Rk and a copy given to him for his records.    Past medical/surgical history, family history, social history, medications and allergies were reviewed.    Patient Active Problem List   Diagnosis     Lichen simplex chronicus     Elevated blood pressure reading without diagnosis of hypertension     Current Outpatient Medications   Medication     BINAXNOW COVID-19 AG HOME TEST KIT     triamcinolone (KENALOG) 0.1 % external ointment     No current facility-administered medications for this visit.       Ht 1.88 m (6' 2\")   Wt 97.5 kg (215 lb)   BMI 27.60 kg/m      Impression/Plan:  Snoring  - Overall no significant Obstructive Sleep Apnea. (Mild sleep apnea noted exclusively supine.)  - Sleep associated hypoxemia was not present.     Treatment options discussed today including  positional therapy.    Elected treatment with positional therapy (avoidance of sleeping on back/supine).  We discussed the use of positional devices and information was provided in AVS.      Follow-up as needed or if symptoms worsen or fail to improve.    20 minutes spent with patient with >50% spent in counseling, chart review/documentation, and coordination of care on the date of the encounter.      LEW Garner CNP  Sleep Medicine      CC:  José Luis Pisano,     This note was written with the assistance of the Dragon voice-dictation technology software. The final document, although reviewed, may contain errors. For corrections, please contact the office.    "

## 2023-06-23 NOTE — PATIENT INSTRUCTIONS
POSITIONAL SLEEP POSITIONAL DEVICES:  Devices that maintain sleeping on the back to prevent snoring and mild sleep apnea.    [email or web address]  [email or web address]  [email or web address]

## 2023-12-16 ENCOUNTER — HEALTH MAINTENANCE LETTER (OUTPATIENT)
Age: 38
End: 2023-12-16

## 2024-08-13 NOTE — PATIENT INSTRUCTIONS
Imaging, semen analysis and labs.      It was a pleasure meeting with you today.  Thank you for allowing me and my team the privilege of caring for you today.  YOU are the reason we are here, and I truly hope we provided you with the excellent service you deserve.  Please let us know if there is anything else we can do for you so that we can be sure you are leaving completely satisfied with your care experience.           Gen:  Well appearing in NAD  Head:  NC/AT  Resp: No distress   Ext: no deformities  Skin: warm and dry as visualized , short segment of about 4cm cordlike tenderness with mild erythema to right medial forearm near antecubital region.   No arm or hand swelling. Distal neurovasc intact. No streaks.

## 2025-01-12 ENCOUNTER — HEALTH MAINTENANCE LETTER (OUTPATIENT)
Age: 40
End: 2025-01-12